# Patient Record
Sex: MALE | Race: WHITE | NOT HISPANIC OR LATINO | Employment: FULL TIME | URBAN - METROPOLITAN AREA
[De-identification: names, ages, dates, MRNs, and addresses within clinical notes are randomized per-mention and may not be internally consistent; named-entity substitution may affect disease eponyms.]

---

## 2018-01-09 ENCOUNTER — OFFICE VISIT CONVERTED (OUTPATIENT)
Dept: CARDIOLOGY | Facility: CLINIC | Age: 48
End: 2018-01-09
Attending: SPECIALIST

## 2018-06-08 ENCOUNTER — OFFICE VISIT CONVERTED (OUTPATIENT)
Dept: FAMILY MEDICINE CLINIC | Facility: CLINIC | Age: 48
End: 2018-06-08
Attending: NURSE PRACTITIONER

## 2018-10-16 ENCOUNTER — OFFICE VISIT CONVERTED (OUTPATIENT)
Dept: CARDIOLOGY | Facility: CLINIC | Age: 48
End: 2018-10-16
Attending: SPECIALIST

## 2019-01-28 ENCOUNTER — HOSPITAL ENCOUNTER (OUTPATIENT)
Dept: FAMILY MEDICINE CLINIC | Facility: CLINIC | Age: 49
Discharge: HOME | End: 2019-01-28
Attending: NURSE PRACTITIONER

## 2019-01-28 ENCOUNTER — OFFICE VISIT CONVERTED (OUTPATIENT)
Dept: FAMILY MEDICINE CLINIC | Facility: CLINIC | Age: 49
End: 2019-01-28
Attending: NURSE PRACTITIONER

## 2019-01-28 LAB
ALBUMIN SERPL-MCNC: 4.6 G/DL (ref 3.5–5)
ALBUMIN/GLOB SERPL: 1.6 {RATIO} (ref 1.4–2.6)
ALP SERPL-CCNC: 55 U/L (ref 53–128)
ALT SERPL-CCNC: 56 U/L (ref 10–40)
ANION GAP SERPL CALC-SCNC: 23 MMOL/L (ref 8–19)
APPEARANCE UR: CLEAR
AST SERPL-CCNC: 26 U/L (ref 15–50)
BASOPHILS # BLD AUTO: 0.04 10*3/UL (ref 0–0.2)
BASOPHILS NFR BLD AUTO: 0.49 % (ref 0–3)
BILIRUB SERPL-MCNC: 0.46 MG/DL (ref 0.2–1.3)
BILIRUB UR QL: NEGATIVE
BUN SERPL-MCNC: 15 MG/DL (ref 5–25)
BUN/CREAT SERPL: 13 {RATIO} (ref 6–20)
CALCIUM SERPL-MCNC: 9.2 MG/DL (ref 8.7–10.4)
CHLORIDE SERPL-SCNC: 104 MMOL/L (ref 99–111)
CHOLEST SERPL-MCNC: 133 MG/DL (ref 107–200)
CHOLEST/HDLC SERPL: 3.6 {RATIO} (ref 3–6)
COLOR UR: YELLOW
CONV CO2: 22 MMOL/L (ref 22–32)
CONV COLLECTION SOURCE (UA): NORMAL
CONV CREATININE URINE, RANDOM: 24.3 MG/DL (ref 10–300)
CONV MICROALBUM.,U,RANDOM: <12 MG/L (ref 0–20)
CONV TOTAL PROTEIN: 7.5 G/DL (ref 6.3–8.2)
CONV UROBILINOGEN IN URINE BY AUTOMATED TEST STRIP: 0.2 {EHRLICHU}/DL (ref 0.1–1)
CREAT UR-MCNC: 1.14 MG/DL (ref 0.7–1.2)
EOSINOPHIL # BLD AUTO: 0.22 10*3/UL (ref 0–0.7)
EOSINOPHIL # BLD AUTO: 2.79 % (ref 0–7)
ERYTHROCYTE [DISTWIDTH] IN BLOOD BY AUTOMATED COUNT: 12.2 % (ref 11.5–14.5)
EST. AVERAGE GLUCOSE BLD GHB EST-MCNC: 120 MG/DL
GFR SERPLBLD BASED ON 1.73 SQ M-ARVRAT: >60 ML/MIN/{1.73_M2}
GLOBULIN UR ELPH-MCNC: 2.9 G/DL (ref 2–3.5)
GLUCOSE SERPL-MCNC: 108 MG/DL (ref 70–99)
GLUCOSE UR QL: NEGATIVE MG/DL
HBA1C MFR BLD: 16.4 G/DL (ref 14–18)
HBA1C MFR BLD: 5.8 % (ref 3.5–5.7)
HCT VFR BLD AUTO: 47.6 % (ref 42–52)
HDLC SERPL-MCNC: 37 MG/DL (ref 40–60)
HGB UR QL STRIP: NEGATIVE
KETONES UR QL STRIP: NEGATIVE MG/DL
LDLC SERPL CALC-MCNC: 74 MG/DL (ref 70–100)
LEUKOCYTE ESTERASE UR QL STRIP: NEGATIVE
LYMPHOCYTES # BLD AUTO: 2.04 10*3/UL (ref 1–5)
MCH RBC QN AUTO: 30.4 PG (ref 27–31)
MCHC RBC AUTO-ENTMCNC: 34.5 G/DL (ref 33–37)
MCV RBC AUTO: 88.1 FL (ref 80–96)
MICROALBUMIN/CREAT UR: 49.4 MG/G{CRE} (ref 0–25)
MONOCYTES # BLD AUTO: 0.63 10*3/UL (ref 0.2–1.2)
MONOCYTES NFR BLD AUTO: 8.12 % (ref 3–10)
NEUTROPHILS # BLD AUTO: 4.87 10*3/UL (ref 2–8)
NEUTROPHILS NFR BLD AUTO: 62.4 % (ref 30–85)
NITRITE UR QL STRIP: NEGATIVE
NRBC BLD AUTO-RTO: 0 % (ref 0–0.01)
OSMOLALITY SERPL CALC.SUM OF ELEC: 299 MOSM/KG (ref 273–304)
PH UR STRIP.AUTO: 6.5 [PH] (ref 5–8)
PLATELET # BLD AUTO: 242 10*3/UL (ref 130–400)
PMV BLD AUTO: 8.3 FL (ref 7.4–10.4)
POTASSIUM SERPL-SCNC: 4.9 MMOL/L (ref 3.5–5.3)
PROT UR QL: NEGATIVE MG/DL
PSA SERPL-MCNC: 2.02 NG/ML (ref 0–4)
RBC # BLD AUTO: 5.4 10*6/UL (ref 4.7–6.1)
SODIUM SERPL-SCNC: 144 MMOL/L (ref 135–147)
SP GR UR: 1.01 (ref 1–1.03)
TRIGL SERPL-MCNC: 110 MG/DL (ref 40–150)
VARIANT LYMPHS NFR BLD MANUAL: 26.2 % (ref 20–45)
VLDLC SERPL-MCNC: 22 MG/DL (ref 5–37)
WBC # BLD AUTO: 7.8 10*3/UL (ref 4.8–10.8)

## 2019-03-12 ENCOUNTER — HOSPITAL ENCOUNTER (OUTPATIENT)
Dept: FAMILY MEDICINE CLINIC | Facility: CLINIC | Age: 49
Discharge: HOME | End: 2019-03-12
Attending: NURSE PRACTITIONER

## 2019-03-12 ENCOUNTER — OFFICE VISIT CONVERTED (OUTPATIENT)
Dept: FAMILY MEDICINE CLINIC | Facility: CLINIC | Age: 49
End: 2019-03-12
Attending: NURSE PRACTITIONER

## 2019-04-02 ENCOUNTER — OFFICE VISIT CONVERTED (OUTPATIENT)
Dept: FAMILY MEDICINE CLINIC | Facility: CLINIC | Age: 49
End: 2019-04-02
Attending: NURSE PRACTITIONER

## 2019-04-02 ENCOUNTER — HOSPITAL ENCOUNTER (OUTPATIENT)
Dept: FAMILY MEDICINE CLINIC | Facility: CLINIC | Age: 49
Discharge: HOME | End: 2019-04-02
Attending: NURSE PRACTITIONER

## 2019-04-02 LAB
ALBUMIN SERPL-MCNC: 4.6 G/DL (ref 3.5–5)
ALP SERPL-CCNC: 53 U/L (ref 53–128)
ALT SERPL-CCNC: 52 U/L (ref 10–40)
AST SERPL-CCNC: 25 U/L (ref 15–50)
BILIRUB SERPL-MCNC: 0.25 MG/DL (ref 0.2–1.3)
CONV BILI, CONJUGATED: <0.2 MG/DL (ref 0–0.6)
CONV TOTAL PROTEIN: 7.2 G/DL (ref 6.3–8.2)
CONV UNCONJUGATED BILIRUBIN: 0.1 MG/DL (ref 0–1.1)

## 2019-04-03 LAB
CONV HEPATITIS B SURFACE AG W CONFIRMATION RE: NEGATIVE
HAV IGM SERPL QL IA: NEGATIVE
HBV CORE IGM SERPL QL IA: NEGATIVE
HCV AB SER DONR QL: <0.1 S/CO RATIO (ref 0–0.9)

## 2019-05-09 ENCOUNTER — OFFICE VISIT CONVERTED (OUTPATIENT)
Dept: OTOLARYNGOLOGY | Facility: CLINIC | Age: 49
End: 2019-05-09
Attending: OTOLARYNGOLOGY

## 2019-05-17 ENCOUNTER — HOSPITAL ENCOUNTER (OUTPATIENT)
Dept: PHYSICAL THERAPY | Facility: CLINIC | Age: 49
Setting detail: RECURRING SERIES
Discharge: HOME | End: 2019-06-28
Attending: NURSE PRACTITIONER

## 2019-09-12 ENCOUNTER — OFFICE VISIT CONVERTED (OUTPATIENT)
Dept: FAMILY MEDICINE CLINIC | Facility: CLINIC | Age: 49
End: 2019-09-12
Attending: NURSE PRACTITIONER

## 2019-09-12 ENCOUNTER — HOSPITAL ENCOUNTER (OUTPATIENT)
Dept: FAMILY MEDICINE CLINIC | Facility: CLINIC | Age: 49
Discharge: HOME | End: 2019-09-12
Attending: NURSE PRACTITIONER

## 2019-09-12 LAB
ALBUMIN SERPL-MCNC: 5 G/DL (ref 3.5–5)
ALBUMIN/GLOB SERPL: 2 {RATIO} (ref 1.4–2.6)
ALP SERPL-CCNC: 48 U/L (ref 53–128)
ALT SERPL-CCNC: 41 U/L (ref 10–40)
ANION GAP SERPL CALC-SCNC: 18 MMOL/L (ref 8–19)
APPEARANCE UR: CLEAR
AST SERPL-CCNC: 26 U/L (ref 15–50)
BASOPHILS # BLD AUTO: 0.04 10*3/UL (ref 0–0.2)
BASOPHILS NFR BLD AUTO: 0.5 % (ref 0–3)
BILIRUB SERPL-MCNC: 0.4 MG/DL (ref 0.2–1.3)
BILIRUB UR QL: NEGATIVE
BUN SERPL-MCNC: 14 MG/DL (ref 5–25)
BUN/CREAT SERPL: 12 {RATIO} (ref 6–20)
CALCIUM SERPL-MCNC: 9.4 MG/DL (ref 8.7–10.4)
CHLORIDE SERPL-SCNC: 103 MMOL/L (ref 99–111)
CHOLEST SERPL-MCNC: 120 MG/DL (ref 107–200)
CHOLEST/HDLC SERPL: 3.1 {RATIO} (ref 3–6)
COLOR UR: YELLOW
CONV ABS IMM GRAN: 0.01 10*3/UL (ref 0–0.2)
CONV CO2: 26 MMOL/L (ref 22–32)
CONV COLLECTION SOURCE (UA): NORMAL
CONV IMMATURE GRAN: 0.1 % (ref 0–1.8)
CONV TOTAL PROTEIN: 7.5 G/DL (ref 6.3–8.2)
CONV UROBILINOGEN IN URINE BY AUTOMATED TEST STRIP: 0.2 {EHRLICHU}/DL (ref 0.1–1)
CREAT UR-MCNC: 1.16 MG/DL (ref 0.7–1.2)
DEPRECATED RDW RBC AUTO: 42.4 FL (ref 35.1–43.9)
EOSINOPHIL # BLD AUTO: 0.16 10*3/UL (ref 0–0.7)
EOSINOPHIL # BLD AUTO: 2 % (ref 0–7)
ERYTHROCYTE [DISTWIDTH] IN BLOOD BY AUTOMATED COUNT: 12.9 % (ref 11.6–14.4)
GFR SERPLBLD BASED ON 1.73 SQ M-ARVRAT: >60 ML/MIN/{1.73_M2}
GLOBULIN UR ELPH-MCNC: 2.5 G/DL (ref 2–3.5)
GLUCOSE SERPL-MCNC: 86 MG/DL (ref 70–99)
GLUCOSE UR QL: NEGATIVE MG/DL
HCT VFR BLD AUTO: 46.1 % (ref 42–52)
HDLC SERPL-MCNC: 39 MG/DL (ref 40–60)
HGB BLD-MCNC: 15 G/DL (ref 14–18)
HGB UR QL STRIP: NEGATIVE
KETONES UR QL STRIP: NEGATIVE MG/DL
LDLC SERPL CALC-MCNC: 63 MG/DL (ref 70–100)
LEUKOCYTE ESTERASE UR QL STRIP: NEGATIVE
LYMPHOCYTES # BLD AUTO: 2.36 10*3/UL (ref 1–5)
LYMPHOCYTES NFR BLD AUTO: 29 % (ref 20–45)
MCH RBC QN AUTO: 29.1 PG (ref 27–31)
MCHC RBC AUTO-ENTMCNC: 32.5 G/DL (ref 33–37)
MCV RBC AUTO: 89.3 FL (ref 80–96)
MONOCYTES # BLD AUTO: 0.74 10*3/UL (ref 0.2–1.2)
MONOCYTES NFR BLD AUTO: 9.1 % (ref 3–10)
NEUTROPHILS # BLD AUTO: 4.82 10*3/UL (ref 2–8)
NEUTROPHILS NFR BLD AUTO: 59.3 % (ref 30–85)
NITRITE UR QL STRIP: NEGATIVE
NRBC CBCN: 0 % (ref 0–0.7)
OSMOLALITY SERPL CALC.SUM OF ELEC: 294 MOSM/KG (ref 273–304)
PH UR STRIP.AUTO: 5.5 [PH] (ref 5–8)
PLATELET # BLD AUTO: 263 10*3/UL (ref 130–400)
PMV BLD AUTO: 10.8 FL (ref 9.4–12.4)
POTASSIUM SERPL-SCNC: 4.8 MMOL/L (ref 3.5–5.3)
PROT UR QL: NEGATIVE MG/DL
RBC # BLD AUTO: 5.16 10*6/UL (ref 4.7–6.1)
SODIUM SERPL-SCNC: 142 MMOL/L (ref 135–147)
SP GR UR: 1.01 (ref 1–1.03)
TRIGL SERPL-MCNC: 92 MG/DL (ref 40–150)
VLDLC SERPL-MCNC: 18 MG/DL (ref 5–37)
WBC # BLD AUTO: 8.13 10*3/UL (ref 4.8–10.8)

## 2019-11-29 ENCOUNTER — HOSPITAL ENCOUNTER (OUTPATIENT)
Dept: URGENT CARE | Facility: CLINIC | Age: 49
Discharge: HOME | End: 2019-11-29
Attending: NURSE PRACTITIONER

## 2020-03-13 ENCOUNTER — HOSPITAL ENCOUNTER (OUTPATIENT)
Dept: FAMILY MEDICINE CLINIC | Facility: CLINIC | Age: 50
Discharge: HOME | End: 2020-03-13
Attending: NURSE PRACTITIONER

## 2020-03-13 ENCOUNTER — OFFICE VISIT CONVERTED (OUTPATIENT)
Dept: FAMILY MEDICINE CLINIC | Facility: CLINIC | Age: 50
End: 2020-03-13
Attending: NURSE PRACTITIONER

## 2020-03-13 LAB
ALBUMIN SERPL-MCNC: 4.8 G/DL (ref 3.5–5)
ALBUMIN/GLOB SERPL: 1.9 {RATIO} (ref 1.4–2.6)
ALP SERPL-CCNC: 44 U/L (ref 53–128)
ALT SERPL-CCNC: 48 U/L (ref 10–40)
ANION GAP SERPL CALC-SCNC: 21 MMOL/L (ref 8–19)
APPEARANCE UR: CLEAR
AST SERPL-CCNC: 24 U/L (ref 15–50)
BASOPHILS # BLD AUTO: 0.06 10*3/UL (ref 0–0.2)
BASOPHILS NFR BLD AUTO: 0.7 % (ref 0–3)
BILIRUB SERPL-MCNC: 0.36 MG/DL (ref 0.2–1.3)
BILIRUB UR QL: NEGATIVE
BUN SERPL-MCNC: 13 MG/DL (ref 5–25)
BUN/CREAT SERPL: 12 {RATIO} (ref 6–20)
CALCIUM SERPL-MCNC: 9.5 MG/DL (ref 8.7–10.4)
CHLORIDE SERPL-SCNC: 100 MMOL/L (ref 99–111)
CHOLEST SERPL-MCNC: 108 MG/DL (ref 107–200)
CHOLEST/HDLC SERPL: 2.9 {RATIO} (ref 3–6)
COLOR UR: YELLOW
CONV ABS IMM GRAN: 0.02 10*3/UL (ref 0–0.2)
CONV CO2: 23 MMOL/L (ref 22–32)
CONV COLLECTION SOURCE (UA): NORMAL
CONV IMMATURE GRAN: 0.2 % (ref 0–1.8)
CONV TOTAL PROTEIN: 7.3 G/DL (ref 6.3–8.2)
CONV UROBILINOGEN IN URINE BY AUTOMATED TEST STRIP: 0.2 {EHRLICHU}/DL (ref 0.1–1)
CREAT UR-MCNC: 1.07 MG/DL (ref 0.7–1.2)
DEPRECATED RDW RBC AUTO: 43.3 FL (ref 35.1–43.9)
EOSINOPHIL # BLD AUTO: 0.15 10*3/UL (ref 0–0.7)
EOSINOPHIL # BLD AUTO: 1.8 % (ref 0–7)
ERYTHROCYTE [DISTWIDTH] IN BLOOD BY AUTOMATED COUNT: 13.1 % (ref 11.6–14.4)
GFR SERPLBLD BASED ON 1.73 SQ M-ARVRAT: >60 ML/MIN/{1.73_M2}
GLOBULIN UR ELPH-MCNC: 2.5 G/DL (ref 2–3.5)
GLUCOSE SERPL-MCNC: 112 MG/DL (ref 70–99)
GLUCOSE UR QL: NEGATIVE MG/DL
HCT VFR BLD AUTO: 51 % (ref 42–52)
HDLC SERPL-MCNC: 37 MG/DL (ref 40–60)
HGB BLD-MCNC: 16.6 G/DL (ref 14–18)
HGB UR QL STRIP: NEGATIVE
KETONES UR QL STRIP: NEGATIVE MG/DL
LDLC SERPL CALC-MCNC: 53 MG/DL (ref 70–100)
LEUKOCYTE ESTERASE UR QL STRIP: NEGATIVE
LYMPHOCYTES # BLD AUTO: 1.87 10*3/UL (ref 1–5)
LYMPHOCYTES NFR BLD AUTO: 21.9 % (ref 20–45)
MCH RBC QN AUTO: 29.2 PG (ref 27–31)
MCHC RBC AUTO-ENTMCNC: 32.5 G/DL (ref 33–37)
MCV RBC AUTO: 89.8 FL (ref 80–96)
MONOCYTES # BLD AUTO: 0.77 10*3/UL (ref 0.2–1.2)
MONOCYTES NFR BLD AUTO: 9 % (ref 3–10)
NEUTROPHILS # BLD AUTO: 5.67 10*3/UL (ref 2–8)
NEUTROPHILS NFR BLD AUTO: 66.4 % (ref 30–85)
NITRITE UR QL STRIP: NEGATIVE
NRBC CBCN: 0 % (ref 0–0.7)
OSMOLALITY SERPL CALC.SUM OF ELEC: 289 MOSM/KG (ref 273–304)
PH UR STRIP.AUTO: 7.5 [PH] (ref 5–8)
PLATELET # BLD AUTO: 301 10*3/UL (ref 130–400)
PMV BLD AUTO: 11 FL (ref 9.4–12.4)
POTASSIUM SERPL-SCNC: 4.8 MMOL/L (ref 3.5–5.3)
PROT UR QL: NEGATIVE MG/DL
RBC # BLD AUTO: 5.68 10*6/UL (ref 4.7–6.1)
SODIUM SERPL-SCNC: 139 MMOL/L (ref 135–147)
SP GR UR: 1.01 (ref 1–1.03)
TRIGL SERPL-MCNC: 91 MG/DL (ref 40–150)
VLDLC SERPL-MCNC: 18 MG/DL (ref 5–37)
WBC # BLD AUTO: 8.54 10*3/UL (ref 4.8–10.8)

## 2020-06-19 ENCOUNTER — OFFICE VISIT CONVERTED (OUTPATIENT)
Dept: FAMILY MEDICINE CLINIC | Facility: CLINIC | Age: 50
End: 2020-06-19
Attending: NURSE PRACTITIONER

## 2020-06-19 ENCOUNTER — HOSPITAL ENCOUNTER (OUTPATIENT)
Dept: FAMILY MEDICINE CLINIC | Facility: CLINIC | Age: 50
Discharge: HOME | End: 2020-06-19
Attending: NURSE PRACTITIONER

## 2020-06-19 LAB
ALBUMIN SERPL-MCNC: 4.8 G/DL (ref 3.5–5)
ALBUMIN/GLOB SERPL: 2 {RATIO} (ref 1.4–2.6)
ALP SERPL-CCNC: 49 U/L (ref 53–128)
ALT SERPL-CCNC: 38 U/L (ref 10–40)
ANION GAP SERPL CALC-SCNC: 20 MMOL/L (ref 8–19)
AST SERPL-CCNC: 22 U/L (ref 15–50)
BASOPHILS # BLD AUTO: 0.06 10*3/UL (ref 0–0.2)
BASOPHILS NFR BLD AUTO: 0.7 % (ref 0–3)
BILIRUB SERPL-MCNC: 0.42 MG/DL (ref 0.2–1.3)
BUN SERPL-MCNC: 16 MG/DL (ref 5–25)
BUN/CREAT SERPL: 15 {RATIO} (ref 6–20)
CALCIUM SERPL-MCNC: 9.7 MG/DL (ref 8.7–10.4)
CHLORIDE SERPL-SCNC: 100 MMOL/L (ref 99–111)
CHOLEST SERPL-MCNC: 110 MG/DL (ref 107–200)
CHOLEST/HDLC SERPL: 2.9 {RATIO} (ref 3–6)
CONV ABS IMM GRAN: 0.02 10*3/UL (ref 0–0.2)
CONV CO2: 23 MMOL/L (ref 22–32)
CONV IMMATURE GRAN: 0.2 % (ref 0–1.8)
CONV TOTAL PROTEIN: 7.2 G/DL (ref 6.3–8.2)
CREAT UR-MCNC: 1.07 MG/DL (ref 0.7–1.2)
DEPRECATED RDW RBC AUTO: 44 FL (ref 35.1–43.9)
EOSINOPHIL # BLD AUTO: 0.13 10*3/UL (ref 0–0.7)
EOSINOPHIL # BLD AUTO: 1.6 % (ref 0–7)
ERYTHROCYTE [DISTWIDTH] IN BLOOD BY AUTOMATED COUNT: 13.3 % (ref 11.6–14.4)
GFR SERPLBLD BASED ON 1.73 SQ M-ARVRAT: >60 ML/MIN/{1.73_M2}
GLOBULIN UR ELPH-MCNC: 2.4 G/DL (ref 2–3.5)
GLUCOSE SERPL-MCNC: 125 MG/DL (ref 70–99)
HCT VFR BLD AUTO: 49.1 % (ref 42–52)
HDLC SERPL-MCNC: 38 MG/DL (ref 40–60)
HGB BLD-MCNC: 16.1 G/DL (ref 14–18)
LDLC SERPL CALC-MCNC: 60 MG/DL (ref 70–100)
LYMPHOCYTES # BLD AUTO: 1.9 10*3/UL (ref 1–5)
LYMPHOCYTES NFR BLD AUTO: 23.3 % (ref 20–45)
MCH RBC QN AUTO: 29.6 PG (ref 27–31)
MCHC RBC AUTO-ENTMCNC: 32.8 G/DL (ref 33–37)
MCV RBC AUTO: 90.3 FL (ref 80–96)
MONOCYTES # BLD AUTO: 0.76 10*3/UL (ref 0.2–1.2)
MONOCYTES NFR BLD AUTO: 9.3 % (ref 3–10)
NEUTROPHILS # BLD AUTO: 5.28 10*3/UL (ref 2–8)
NEUTROPHILS NFR BLD AUTO: 64.9 % (ref 30–85)
NRBC CBCN: 0 % (ref 0–0.7)
OSMOLALITY SERPL CALC.SUM OF ELEC: 289 MOSM/KG (ref 273–304)
PLATELET # BLD AUTO: 278 10*3/UL (ref 130–400)
PMV BLD AUTO: 11 FL (ref 9.4–12.4)
POTASSIUM SERPL-SCNC: 4.9 MMOL/L (ref 3.5–5.3)
PSA SERPL-MCNC: 2.38 NG/ML (ref 0–4)
RBC # BLD AUTO: 5.44 10*6/UL (ref 4.7–6.1)
SODIUM SERPL-SCNC: 138 MMOL/L (ref 135–147)
TRIGL SERPL-MCNC: 59 MG/DL (ref 40–150)
VLDLC SERPL-MCNC: 12 MG/DL (ref 5–37)
WBC # BLD AUTO: 8.15 10*3/UL (ref 4.8–10.8)

## 2020-12-18 ENCOUNTER — OFFICE VISIT CONVERTED (OUTPATIENT)
Dept: FAMILY MEDICINE CLINIC | Facility: CLINIC | Age: 50
End: 2020-12-18
Attending: NURSE PRACTITIONER

## 2020-12-19 ENCOUNTER — HOSPITAL ENCOUNTER (OUTPATIENT)
Dept: FAMILY MEDICINE CLINIC | Facility: CLINIC | Age: 50
Discharge: HOME | End: 2020-12-19
Attending: NURSE PRACTITIONER

## 2020-12-19 LAB
ALBUMIN SERPL-MCNC: 4.4 G/DL (ref 3.5–5)
ALBUMIN/GLOB SERPL: 1.9 {RATIO} (ref 1.4–2.6)
ALP SERPL-CCNC: 48 U/L (ref 53–128)
ALT SERPL-CCNC: 35 U/L (ref 10–40)
ANION GAP SERPL CALC-SCNC: 13 MMOL/L (ref 8–19)
AST SERPL-CCNC: 28 U/L (ref 15–50)
BILIRUB SERPL-MCNC: 0.4 MG/DL (ref 0.2–1.3)
BUN SERPL-MCNC: 15 MG/DL (ref 5–25)
BUN/CREAT SERPL: 12 {RATIO} (ref 6–20)
CALCIUM SERPL-MCNC: 9 MG/DL (ref 8.7–10.4)
CHLORIDE SERPL-SCNC: 101 MMOL/L (ref 99–111)
CHOLEST SERPL-MCNC: 106 MG/DL (ref 107–200)
CHOLEST/HDLC SERPL: 3 {RATIO} (ref 3–6)
CONV CO2: 27 MMOL/L (ref 22–32)
CONV TOTAL PROTEIN: 6.7 G/DL (ref 6.3–8.2)
CREAT UR-MCNC: 1.28 MG/DL (ref 0.7–1.2)
EST. AVERAGE GLUCOSE BLD GHB EST-MCNC: 128 MG/DL
GFR SERPLBLD BASED ON 1.73 SQ M-ARVRAT: >60 ML/MIN/{1.73_M2}
GLOBULIN UR ELPH-MCNC: 2.3 G/DL (ref 2–3.5)
GLUCOSE SERPL-MCNC: 101 MG/DL (ref 70–99)
HBA1C MFR BLD: 6.1 % (ref 3.5–5.7)
HDLC SERPL-MCNC: 35 MG/DL (ref 40–60)
LDLC SERPL CALC-MCNC: 55 MG/DL (ref 70–100)
OSMOLALITY SERPL CALC.SUM OF ELEC: 283 MOSM/KG (ref 273–304)
POTASSIUM SERPL-SCNC: 4.5 MMOL/L (ref 3.5–5.3)
SODIUM SERPL-SCNC: 136 MMOL/L (ref 135–147)
TRIGL SERPL-MCNC: 80 MG/DL (ref 40–150)
VLDLC SERPL-MCNC: 16 MG/DL (ref 5–37)

## 2021-03-11 ENCOUNTER — OFFICE VISIT CONVERTED (OUTPATIENT)
Dept: FAMILY MEDICINE CLINIC | Facility: CLINIC | Age: 51
End: 2021-03-11
Attending: NURSE PRACTITIONER

## 2021-05-07 NOTE — PROGRESS NOTES
Progress Note      Patient Name: Brent Crane   Patient ID: [id number]   Sex: Male   YOB: 1970        Visit Date: April 2, 2019    Provider: NAHID Frankel   Location: [facility]   Location Address: [address] [place] [address]  [id number]   Location Phone: [phone]          Chief Complaint     follow up on shoulder  labs ordered in February.       History Of Present Illness  Brent Crane is a 48 year old /White male who presents for evaluation and treatment of:      follow up to right shoulder pain - pain is improving but continue to have numbness and tingling in the hand, worsens with driving and sitting in certain positions and will shoot pain through the shoulder down the arm and to the fingers, feels like electrical bolts - pain is isolated to the posterior neck and shoulder blade area - took the muscle relaxers at night and could not tell a huge difference with the medication, the more active the better it felt until he sit in the car to drive home 45 minutes - tilting head to the right shoulder shoots pain into the armpit and turning in another direction will shoot into shoulder blade     pt is also fasting for labs that were ordered in February       Past Medical History  Disease Name Date Onset Notes   Allergic rhinitis --  --    High blood pressure --  --    Hyperlipidemia --  --          Past Surgical History  Procedure Name Date Notes   I have had no surgeries --  --          Medication List  Name Date Started Instructions   esomeprazole magnesium 20 mg oral capsule,delayed release(DR/EC) 06/08/2018 take 1 capsule (20 mg) by oral route once daily at least 1 hour before a meal   lisinopril 10 mg oral tablet 01/28/2019 take 1 tablet (10 mg) by oral route once daily ( along with 5 mg total dose 15 mg daily)   lisinopril 5 mg oral tablet 01/28/2019 take 1 tablet (5 mg) by oral route once daily with the 10 mg total dose 15 mg daily  "  rosuvastatin 10 mg oral tablet 01/28/2019 take 1 tablet (10 mg) by oral route once daily for 90 days         Allergy List  Allergen Name Date Reaction Notes   NO KNOWN DRUG ALLERGIES --  --  --          Family Medical History  Disease Name Relative/Age Notes   Malignant neoplasm  --          Social History  Finding Status Start/Stop Quantity Notes   Alcohol Light --/-- --  --    Tobacco Current every day --/-- 3/4 can a day 3/4 can per day         Review of Systems  · Constitutional  o Denies  o : fever, chills  · Cardiovascular  o Denies  o : chest pain, palpitations  · Respiratory  o Denies  o : shortness of breath  · Gastrointestinal  o Denies  o : nausea, vomiting, diarrhea  · Neurologic  o * See HPI  · Musculoskeletal  o * See HPI      Vitals  Date Time BP Position Site L\R Cuff Size HR RR TEMP (F) WT  HT  BMI kg/m2 BSA m2 O2 Sat HC       04/02/2019 08:12 /88 Sitting    86 - R  97.2 175lbs 6oz 5'  7.5\" 27.06 1.95 98 %          Physical Examination  · Constitutional  o Appearance  o : well developed, well-nourished, in no acute distress  · Eyes  o Conjunctivae  o : conjunctivae normal  o Pupils and Irises  o : pupils equal and round, pupils reactive to light bilaterally  · Neck  o Inspection/Palpation  o : supple  o Thyroid  o : no thyromegaly  · Respiratory  o Respiratory Effort  o : breathing unlabored  o Auscultation of Lungs  o : clear to ascultation  · Cardiovascular  o Heart  o :   § Auscultation of Heart  § : regular rate and rhythm  o Peripheral Vascular System  o :   § Extremities  § : no edema  · Lymphatic  o Neck  o : no lymphadenopathy present  · Musculoskeletal  o General  o :   § General Musculoskeletal  § : increased muscle tension in the neck and trapezius muscles without tenderness along the cervical spine. No joint swelling or deformity., Muscle tone, strength, and development grossly normal.  · Skin and Subcutaneous Tissue  o General Inspection  o : NL tone  · Neurologic  o Gait and " Station  o :   § Gait Screening  § : normal gait  · Psychiatric  o Mood and Affect  o : mood normal, affect appropriate              Assessment  · Nocturia     788.43/R35.1  · Neck pain     723.1/M54.2      Plan  · Orders  o ACO-39: Current medications updated and reviewed () - - 04/02/2019  o Cervical Spine Complete [facility] ([id number]) - 723.1/M54.2 - 04/02/2019  o UROLOGY CONSULTATION ([facility]) - 788.43/R35.1 - 04/02/2019   PSA slightly elevated for age  o PHYSICAL THERAPY CONSULTATION ([facility]) - 723.1/M54.2 - 04/02/2019  · Medications  o meloxicam 15 mg oral tablet   SIG: take 1 tablet (15 mg) by oral route once daily for 30 days   DISP: (30) tablets with 2 refills  Prescribed on 04/02/2019     o cyclobenzaprine 10 mg oral tablet   SIG: take 1 tablet by oral route once a day (at bedtime) for 30 days   DISP: (30) tablets with 0 refills  Prescribed on 04/02/2019     · Instructions  o Take all medications as prescribed/directed.  o Patient was educated/instructed on their diagnosis, treatment and medications prior to discharge from the clinic today.  · Disposition  o Follow up as needed.            Electronically Signed by: NAHID Frankel -Author on April 2, 2019 09:09:03 AM

## 2021-05-07 NOTE — PROGRESS NOTES
Progress Note      Patient Name: Danilo Crane   Patient ID: [id number]   Sex: Male   YOB: 1970        Visit Date: June 19, 2020    Provider: NAHID Frankel   Location: [facility]   Location Address: [address] [place] [address] [place]  [address]   Location Phone: [phone]          Chief Complaint     med refills       History Of Present Illness  Danilo Crane is a 49 year old /White male who presents for evaluation and treatment of:      HTN: stable in office - when checks at home systolic is usually in 130's - does not follow a low salt diet, but does not add additional salt - very little exercise, walks at work 8 hours/day and some yard work    trying to quit smoking    GERD: using OTC omeprazole - well controlled     Hyperlipidemia: continues to have some cramps to the feet usually at night          Past Medical History  Disease Name Date Onset Notes   Allergic rhinitis --  --    Essential hypertension 03/13/2020 --    High blood pressure --  --    Hyperlipidemia --  --          Past Surgical History  Procedure Name Date Notes   I have had no surgeries --  --          Medication List  Name Date Started Instructions   esomeprazole magnesium 20 mg oral capsule,delayed release(/EC) 06/08/2018 take 1 capsule (20 mg) by oral route once daily at least 1 hour before a meal   lisinopril-hydrochlorothiazide 20-12.5 mg oral tablet 03/13/2020 take 1 tablet by oral route once daily for 90 days   rosuvastatin 10 mg oral tablet 03/13/2020 take 1 tablet (10 mg) by oral route once daily for 90 days         Allergy List  Allergen Name Date Reaction Notes   NO KNOWN DRUG ALLERGIES --  --  --          Family Medical History  Disease Name Relative/Age Notes   Malignant neoplasm  --          Social History  Finding Status Start/Stop Quantity Notes   Alcohol Light --/-- --  --    Tobacco Current every day --/-- 1/2 can a day 1/2 can per day         Review of  "Systems  · Constitutional  o Denies  o : headache  · Cardiovascular  o Denies  o : chest pain, lower extremity edema, palpitations  · Respiratory  o Denies  o : shortness of breath, wheezing, cough      Vitals  Date Time BP Position Site L\R Cuff Size HR RR TEMP (F) WT  HT  BMI kg/m2 BSA m2 O2 Sat        06/19/2020 08:09 /84 Sitting    81 - R  91.4 169lbs 6oz 5'  6\" 27.34 1.89 97 %          Physical Examination  · Constitutional  o Appearance  o : well developed, well-nourished, in no acute distress  · Eyes  o Conjunctivae  o : conjunctivae normal  · Neck  o Inspection/Palpation  o : supple  o Thyroid  o : no thyromegaly  · Respiratory  o Respiratory Effort  o : breathing unlabored  o Auscultation of Lungs  o : clear to ascultation  · Cardiovascular  o Heart  o :   § Auscultation of Heart  § : regular rate and rhythm  o Peripheral Vascular System  o :   § Extremities  § : no edema  · Lymphatic  o Neck  o : no lymphadenopathy present  · Musculoskeletal  o General  o :   § General Musculoskeletal  § : No joint swelling or deformity., Muscle tone, strength, and development grossly normal.  · Skin and Subcutaneous Tissue  o General Inspection  o : no lesions present, no areas of discoloration, skin turgor normal, texture normal  · Neurologic  o Gait and Station  o :   § Gait Screening  § : normal gait  · Psychiatric  o Mood and Affect  o : mood normal, affect appropriate          Assessment  · Essential hypertension     401.9/I10  · GERD (gastroesophageal reflux disease)     530.81/K21.9  · Hyperlipidemia     272.4/E78.5  · Prostate cancer screening     V76.44/Z12.5      Plan  · Orders  o HTN/Lipid Panel (CMP, Lipid) [facility] ([id number], 10690) - 401.9/I10 - 06/19/2020  o CBC with Auto Diff [facility] ([id number]) - 401.9/I10 - 06/19/2020  o ACO-18: Negative screen for clinical depression using a standardized tool () - - 06/19/2020  o ACO-39: Current medications updated and reviewed () - - 06/19/2020  o PSA Ultrasensitive, " ANNUAL SCREENING [facility] ([id number]) - V76.44/Z12.5 - 06/19/2020  · Medications  o lisinopril-hydrochlorothiazide 20-12.5 mg oral tablet   SIG: take 1 tablet by oral route once daily for 90 days   DISP: (90) tablets with 1 refills  Adjusted on 06/19/2020     o rosuvastatin 10 mg oral tablet   SIG: take 1 tablet (10 mg) by oral route once daily for 90 days   DISP: (90) Tablet with 1 refills  Adjusted on 06/19/2020     o Medications have been Reconciled  o Transition of Care or Provider Policy  · Instructions  o Take all medications as prescribed/directed.  o Patient was educated/instructed on their diagnosis, treatment and medications prior to discharge from the clinic today.  o Chronic conditions reviewed and taken into consideration for today's treatment plan.            Electronically Signed by: NAHID Frankel -Author on June 19, 2020 09:00:23 AM

## 2021-05-07 NOTE — PROGRESS NOTES
Progress Note      Patient Name: Brent Crane   Patient ID: [id number]   Sex: Male   YOB: 1970        Visit Date: September 12, 2019    Provider: NAHID Frankel   Location: [facility]   Location Address: [address] [place] [address] [address] [place]  [address]   Location Phone: [phone]          Chief Complaint     medication refills       History Of Present Illness  Brent Crane is a 48 year old /White male who presents for evaluation and treatment of:      medication refills    HTN: states he ran out of medication awhile ago and has been taking Lisinopril 20mg with HCTZ - pt states he had decreased HA and his BP was slightly lower, admits to occasional muscle cramps in the feet - diet has been all over the place - activity includes walking while at work    Hyperlipidemia: no SE of medication    GERD: stable on medication - has been on Nexium for years and states has been buying OTC     Had an A1C drawn in Jan and states he would get jittery especially if he is hot and hasn't eaten     States he never went to Urology - he continues to have some post-voiding dribbling and nocturia           Past Medical History  Disease Name Date Onset Notes   Allergic rhinitis --  --    High blood pressure --  --    Hyperlipidemia --  --          Past Surgical History  Procedure Name Date Notes   I have had no surgeries --  --          Medication List  Name Date Started Instructions   esomeprazole magnesium 20 mg oral capsule,delayed release(/EC) 06/08/2018 take 1 capsule (20 mg) by oral route once daily at least 1 hour before a meal   lisinopril 10 mg oral tablet 07/28/2019 take 1 tablet (10 mg) by oral route once daily ( along with 5 mg total dose 15 mg daily)   lisinopril 5 mg oral tablet 07/28/2019 take 1 tablet (5 mg) by oral route once daily with the 10 mg total dose 15 mg daily   lisinopril-hydrochlorothiazide 20-12.5 mg oral tablet 09/12/2019 take 1 tablet by oral  route once daily for 90 days   rosuvastatin 10 mg oral tablet 01/28/2019 take 1 tablet (10 mg) by oral route once daily for 90 days         Allergy List  Allergen Name Date Reaction Notes   NO KNOWN DRUG ALLERGIES --  --  --        Allergies Reconciled  Family Medical History  Disease Name Relative/Age Notes   Malignant neoplasm  --          Social History  Finding Status Start/Stop Quantity Notes   Alcohol Light --/-- --  --    Tobacco Current every day --/-- 1/2 can a day 1/2 can per day         Review of Systems  · Constitutional  o Denies  o : fever, chills, body aches  · Cardiovascular  o Denies  o : chest pain, palpitations  · Respiratory  o Denies  o : shortness of breath, wheezing, cough  · Gastrointestinal  o Denies  o : nausea, vomiting      Vitals  Date Time BP Position Site L\R Cuff Size HR RR TEMP (F) WT  HT  BMI kg/m2 BSA m2 O2 Sat HC       09/12/2019 03:12 /92 Sitting    91 - R  97.2 172lbs 0oz    98 %          Physical Examination  · Constitutional  o Appearance  o : well developed, well-nourished, in no acute distress  · Eyes  o Conjunctivae  o : conjunctivae normal  o Pupils and Irises  o : pupils equal and round, pupils reactive to light bilaterally  · Neck  o Inspection/Palpation  o : supple  o Thyroid  o : no thyromegaly  · Respiratory  o Respiratory Effort  o : breathing unlabored  o Auscultation of Lungs  o : clear to ascultation  · Cardiovascular  o Heart  o :   § Auscultation of Heart  § : regular rate and rhythm  o Peripheral Vascular System  o :   § Extremities  § : no edema  · Lymphatic  o Neck  o : no lymphadenopathy present  · Musculoskeletal  o General  o :   § General Musculoskeletal  § : No joint swelling or deformity. Muscle tone, strength, and development grossly normal.  · Skin and Subcutaneous Tissue  o General Inspection  o : NL tone  · Neurologic  o Gait and Station  o :   § Gait Screening  § : normal gait  · Psychiatric  o Mood and Affect  o : mood normal, affect  appropriate              Assessment  · Essential hypertension     401.9/I10  · Hyperlipidemia     272.4/E78.5  · Nocturia     788.43/R35.1  · Post-void dribbling     788.35/N39.43      Plan  · Orders  o CBC with Auto Diff [facility] ([id number]) - 401.9/I10 - 09/12/2019  o CMP [facility] ([id number]) - 401.9/I10 - 09/12/2019  o Lipid Panel [facility] ([id number]) - 272.4/E78.5 - 09/12/2019  o Urinalysis with Reflex Microscopy if abnormal ([facility]) (55794) - 401.9/I10 - 09/12/2019  o ACO-39: Current medications updated and reviewed () - - 09/12/2019  o PSA Ultrasensitive, ANNUAL SCREENING [facility] ([id number]) - 788.43/R35.1, 788.35/N39.43 - 09/12/2019  · Medications  o lisinopril-hydrochlorothiazide 20-12.5 mg oral tablet   SIG: take 1 tablet by oral route once daily for 90 days   DISP: (90) tablets with 1 refills  Adjusted on 09/12/2019     o rosuvastatin 10 mg oral tablet   SIG: take 1 tablet (10 mg) by oral route once daily for 90 days   DISP: (90) Tablet with 1 refills  Adjusted on 09/12/2019     o lisinopril 10 mg oral tablet   SIG: take 1 tablet (10 mg) by oral route once daily ( along with 5 mg total dose 15 mg daily)   DISP: (30) tablets with 0 refills  Discontinued on 09/12/2019     o lisinopril 5 mg oral tablet   SIG: take 1 tablet (5 mg) by oral route once daily with the 10 mg total dose 15 mg daily   DISP: (30) Tablet with 0 refills  Discontinued on 09/12/2019     o Medications have been Reconciled  o Transition of Care or Provider Policy  · Instructions  o Advised that cheeses and other sources of dairy fats, animal fats, fast food, and the extras (candy, pasteries, pies, doughnuts and cookies) all contain LDL raising nutrients. Advised to increase fruits, vegetables, whole grains, and to monitor portion sizes.   o Take all medications as prescribed/directed.  o Patient was educated/instructed on their diagnosis, treatment and medications prior to discharge from the clinic today.  o Chronic conditions reviewed and taken into consideration for  today's treatment plan.  · Disposition  o Follow up as needed.            Electronically Signed by: NAHID Frankel -Author on September 12, 2019 03:38:23 PM

## 2021-05-07 NOTE — PROGRESS NOTES
Progress Note      Patient Name: Brent Crane   Patient ID: [id number]   Sex: Male   YOB: 1970        Visit Date: June 8, 2018    Provider: NAHID Frankel   Location: [facility]   Location Address: [address] [place] [address]  [id number]   Location Phone: [phone]          Chief Complaint     needs medications refilled, wants to talk about nexium       History Of Present Illness  Brent Crane is a 47 year old /White male who presents for evaluation and treatment of:      Chronic health conditions and obtain refills of medications    HTN: stable - at times will feel like his BP raises - denies CP, palpitations, swelling of the legs. does not follow a low salt diet - decreased physical activity due to a right sided headache - drinks good amount of water (about 100 oz/day while at work)    Hyperlipidemia: will rarely have muscle cramps - takes 200mg CoQ10 daily    GERD: taking Nexium OTC - he has been taking 20mg once daily OTC and symptoms are well controlled - requesting to have prescription due to wife has new insurance and thinks it maybe covered       Past Medical History  Disease Name Date Onset Notes   Allergic rhinitis --  --    High blood pressure --  --    Hyperlipidemia --  --          Past Surgical History  Procedure Name Date Notes   I have had no surgeries --  --          Medication List  Name Date Started Instructions   esomeprazole magnesium 20 mg oral capsule,delayed release(DR/EC)  take 1 capsule daily   lisinopril 10 mg oral tablet 12/06/2017 take 1 tablet (10 mg) by oral route once daily ( along with 5 mg total dose 15 mg daily)   lisinopril 5 mg oral tablet 12/06/2017 take 1 tablet (5 mg) by oral route once daily with the 10 mg total dose 15 mg daily   rosuvastatin 10 mg oral tablet 12/06/2017 take 1 tablet (10 mg) by oral route once daily         Allergy List  Allergen Name Date Reaction Notes   NO KNOWN DRUG ALLERGIES --   --  --          Family Medical History  Disease Name Relative/Age Notes   Malignant neoplasm / --          Social History  Finding Status Start/Stop Quantity Notes   Alcohol Light --/-- --  --    Tobacco Current every day --/-- 3/4 can a day 3/4 can per day         Review of Systems  · Constitutional  o Admits  o : headache  · Eyes  o Admits  o : changes in vision  o Denies  o : blurred vision  · Cardiovascular  o Denies  o : chest pain, swelling (feet, ankles, hands), palpitations  · Respiratory  o Denies  o : shortness of breath, wheezing, cough  · Gastrointestinal  o Admits  o : heartburn  · Allergic-Immunologic  o Admits  o : sinus allergy symptoms  o Denies  o : frequent illnesses      Vitals  Date Time BP Position Site L\R Cuff Size HR RR TEMP(F) WT  HT  BMI kg/m2 BSA m2 O2 Sat HC       06/08/2018 03:26 /86 Sitting    107 - R  97.6 175lbs 0oz    100 %           Physical Examination  · Constitutional  o Appearance  o : well developed, well-nourished, in no acute distress  · Eyes  o Conjunctivae  o : conjunctivae normal  o Pupils and Irises  o : pupils equal and round, pupils reactive to light bilaterally  · Neck  o Inspection/Palpation  o : supple  o Thyroid  o : no thyromegaly  · Respiratory  o Respiratory Effort  o : breathing unlabored  o Auscultation of Lungs  o : clear to ascultation  · Cardiovascular  o Heart  o :   § Auscultation of Heart  § : regular rate and rhythm  o Peripheral Vascular System  o :   § Extremities  § : no edema  · Lymphatic  o Neck  o : no lymphadenopathy present  · Musculoskeletal  o General  o :   § General Musculoskeletal  § : No joint swelling or deformity. Muscle tone, strength, and development grossly normal.  · Skin and Subcutaneous Tissue  o General Inspection  o : NL tone  · Neurologic  o Gait and Station  o :   § Gait Screening  § : normal gait  · Psychiatric  o Mood and Affect  o : mood normal, affect appropriate              Assessment  · Essential  hypertension     401.9/I10  · GERD (gastroesophageal reflux disease)     530.81/K21.9  · Hyperlipidemia     272.4/E78.5      Plan  · Orders  o CBC with Auto Diff [facility] ([id number]) - 401.9/I10 - 06/08/2018  o CMP [facility] ([id number]) - 401.9/I10 - 06/08/2018  o Lipid Panel [facility] ([id number]) - 272.4/E78.5 - 06/08/2018  o Urinalysis with Reflex Microscopy if abnormal ([facility]) ([id number]) - 272.4/E78.5, 401.9/I10 - 06/08/2018  o ACO-39: Current medications updated and reviewed () - - 06/08/2018  · Medications  o esomeprazole magnesium 20 mg oral capsule,delayed release(DR/EC)   SIG: take 1 capsule (20 mg) by oral route once daily at least 1 hour before a meal   DISP: (90) capsule with 1 refills  Adjusted on 06/08/2018     o lisinopril 10 mg oral tablet   SIG: take 1 tablet (10 mg) by oral route once daily ( along with 5 mg total dose 15 mg daily)   DISP: (90) tablets with 1 refills  Adjusted on 06/08/2018     o lisinopril 5 mg oral tablet   SIG: take 1 tablet (5 mg) by oral route once daily with the 10 mg total dose 15 mg daily   DISP: (90) tablets with 1 refills  Adjusted on 06/08/2018     o rosuvastatin 10 mg oral tablet   SIG: take 1 tablet (10 mg) by oral route once daily for 90 days   DISP: (90) tablet with 1 refills  Adjusted on 06/08/2018     · Instructions  o Patient advised to monitor blood pressure (B/P) at home and journal readings. Patient informed that a B/P reading at home of more than 135/85 is considered hypertension. For readings greater myuf028/90 or higher patient is advised to follow up in the office with readings for management. Patient advised to limit sodium intake.  o Advised that cheeses and other sources of dairy fats, animal fats, fast food, and the extras (candy, pasteries, pies, doughnuts and cookies) all contain LDL raising nutrients. Advised to increase fruits, vegetables, whole grains, and to monitor portion sizes.   o Take all medications as prescribed/directed.  o Patient was educated/instructed on their  diagnosis, treatment and medications prior to discharge from the clinic today.            Electronically Signed by: NAHID Frankel -Author on June 8, 2018 04:13:13 PM

## 2021-05-07 NOTE — PROGRESS NOTES
Progress Note      Patient Name: Danilo Crane   Patient ID: [id number]   Sex: Male   YOB: 1970        Visit Date: December 18, 2020    Provider: NAHID Baig   Location: [facility]   Location Address: [address] [place] [address] [place]  [address]   Location Phone: [phone]          Chief Complaint     medication refills       History Of Present Illness  Danilo Crane is a 50 year old /White male who presents for evaluation and treatment of:      follow up of chronic health conditions and to refill meds.    He thinks he has been having small bouts with anxiety. Heart racing, gets jittery. 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 Sleeping ok. Due to marital situation. Currently living at his home. All children grown. He thinks things will get better. Currently . Reports a hx of minor anxiety episodes that don't last long. Has some friends for support. Has willis in [place].     HTN: Has not been routinely checking BP. No persistent headaches, no dizziness, chest pain. Maybe slight swelling at end of the day.  Does experience leg cramps. Wears compression socks,    GERD: uses OTC PPI fro symptoms- stable    Hyperlipidemia: no problems with meds. Has been eating out more - does not choose bread but likes potatoes. No soda's and avoids most sweets.    Bowel habits have changed, eating out a lot, seeing more diarrhea. Seeing a little more diarrhea.    Elevated Blood Sugar- noted to be 125  on labs 6/2020    Uses smokeless tobacco- since teenager. Not motivated to stop at this time.           Past Medical History  Disease Name Date Onset Notes   Allergic rhinitis --  --    Essential hypertension 03/13/2020 --    High blood pressure --  --    Hyperlipidemia --  --          Past Surgical History  Procedure Name Date Notes   I have had no surgeries --  --          Medication List  Name Date Started Instructions    lisinopril-hydrochlorothiazide 20-12.5 mg oral tablet 12/18/2020 take 1 tablet by oral route once daily for 90 days   rosuvastatin 10 mg oral tablet 12/18/2020 take 1 tablet (10 mg) by oral route once daily for 90 days         Allergy List  Allergen Name Date Reaction Notes   NO KNOWN DRUG ALLERGIES --  --  --          Family Medical History  Disease Name Relative/Age Notes   Malignant neoplasm  --          Social History  Finding Status Start/Stop Quantity Notes   Alcohol Light --/-- --  --    Tobacco Current every day --/-- 1/2 can a day 1/2 can per day         Review of Systems  · Constitutional  o Admits  o : good general health lately  o Denies  o : fatigue, headache  · Eyes  o Admits  o : blurred vision  · HENT  o Denies  o : nasal congestion, sore throat, ear pain  · Cardiovascular  o Admits  o : palpitations  o Denies  o : chest pain  · Respiratory  o Denies  o : shortness of breath, wheezing, cough  · Gastrointestinal  o Admits  o : diarrhea, reflux  o Denies  o : nausea, vomiting  · Psychiatric  o Admits  o : anxiety  o Denies  o : depression, difficulty sleeping      Vitals  Date Time BP Position Site L\R Cuff Size HR RR TEMP (F) WT  HT  BMI kg/m2 BSA m2 O2 Sat FR L/min FiO2 HC       12/18/2020 01:42 /84 Sitting    106 - R  97.3 171lbs 7oz    96 %  21%          Physical Examination  · Constitutional  o Appearance  o : well developed, well-nourished, in no acute distress  · Eyes  o Conjunctivae  o : conjunctivae normal  · Neck  o Inspection/Palpation  o : supple  · Respiratory  o Respiratory Effort  o : breathing unlabored  o Auscultation of Lungs  o : clear to ascultation  · Cardiovascular  o Heart  o :   § Auscultation of Heart  § : regular rate and rhythm  o Peripheral Vascular System  o :   § Extremities  § : no edema  · Gastrointestinal  o Abdominal Examination  o :   § Abdomen  § : soft non tender  · Musculoskeletal  o General  o :   § General Musculoskeletal  § : No joint swelling or  deformity., Muscle tone, strength, and development grossly normal.  · Skin and Subcutaneous Tissue  o General Inspection  o : no lesions present, no areas of discoloration, skin turgor normal, texture normal  · Neurologic  o Gait and Station  o :   § Gait Screening  § : normal gait  · Psychiatric  o Mood and Affect  o : mood normal, affect appropriate          Results     PSA reviewed 6/2020       Assessment  · Need for influenza vaccination     V04.81/Z23  · GERD (gastroesophageal reflux disease)     530.81/K21.9  · Hyperlipidemia     272.4/E78.5  · Nicotine dependence     305.1/F17.200  · Tobacco abuse counseling       Tobacco abuse counseling     V65.42/Z71.6  · High blood pressure     401.9/I10  · Prediabetes     790.29/R73.03  · Anxiety     300.00/F41.9      Plan  · Orders  o Immunization Admin Fee (Single) ([facility]) (32566) - V04.81/Z23 - 12/18/2020  o Smoking cessation counseling, 3-10 minutes [facility] (33969) - V65.42/Z71.6 - 12/18/2020  o ACO-17: Screened for tobacco use AND received tobacco cessation intervention (4004F) - V65.42/Z71.6 - 12/18/2020  o CMP [facility] (75741) - 401.9/I10 - 12/18/2020  o Hgb A1c [facility] (05881) - 790.29/R73.03 - 12/18/2020  o Lipid Panel [facility] (58570) - 272.4/E78.5 - 12/18/2020  o ACO-39: Current medications updated and reviewed (1159F, ) - - 12/18/2020  o Fluzone Quadrivalent Vaccine, age 3+ (65503) - V04.81/Z23 - 12/18/2020   Vaccine - Influenza; Dose: 0.5; Site: Left Arm; Route: Intramuscular; Date: 12/18/2020 14:29:00; Exp: 06/30/2021; Lot: [id number]; Mfg: sanMaximus pasteur; TradeName: FLUZONE; Administered By: Mili Hercules MA; Comment: [id number]  · Medications  o hydroxyzine HCl 10 mg oral tablet   SIG: take 1 tablet by oral route TID prn anxiety   DISP: (30) Tablet with 1 refills  Prescribed on 12/18/2020     o lisinopril-hydrochlorothiazide 20-12.5 mg oral tablet   SIG: take 1 tablet by oral route once daily for 90 days   DISP: (90) Tablet with 1 refills  Refilled on 12/18/2020      o rosuvastatin 10 mg oral tablet   SIG: take 1 tablet (10 mg) by oral route once daily for 90 days   DISP: (90) Tablet with 1 refills  Refilled on 12/18/2020     o esomeprazole magnesium 20 mg oral capsule,delayed release(DR/EC)   SIG: take 1 capsule (20 mg) by oral route once daily at least 1 hour before a meal   DISP: (90) capsule with 1 refills  Discontinued on 12/18/2020     o Medications have been Reconciled  o Transition of Care or Provider Policy  · Instructions  o Advised that cheeses and other sources of dairy fats, animal fats, fast food, and the extras (candy, pasteries, pies, doughnuts and cookies) all contain LDL raising nutrients. Advised to increase fruits, vegetables, whole grains, and to monitor portion sizes.   o *Form of nicotine being used: smokeless  o Patient was strongly encouraged to discontinue use of any nicotine containing product or minimize the use of the product.  o Discussed smoking cessation and counseling with patient for over 3 minutes.  o Tobacco and smoking cessation counseling for more than 3 minutes was completed.  o Patient was educated/instructed on their diagnosis, treatment and medications prior to discharge from the clinic today.  o Patient counseled to stop smoking.  o Discussed need to be intentional on not overdoing CHO's , especially with eating out more  o Discussed possible side effects of hydroxyzine- especially drowsiness  o Encouraged to stop tobacco use  o Loose stool could be related to diet changes or anxiety. Encouraged better diet.            Electronically Signed by: NAHID Baig -Author on December 18, 2020 02:52:17 PM

## 2021-05-07 NOTE — PROGRESS NOTES
Progress Note      Patient Name: Danilo Crane   Patient ID: [id number]   Sex: Male   YOB: 1970        Visit Date: March 13, 2020    Provider: NAHID Frankel   Location: [facility]   Location Address: [address] [place] [address] [place]  [address]   Location Phone: [phone]          Chief Complaint     refills and labs, patient is fasting       History Of Present Illness  Danilo Crane is a 49 year old /White male who presents for evaluation and treatment of:      refills of medications and fasting labs    GERD: he is buying omeprazole OTC and works fairly well for symptoms - no additional antacids needed     HTN: stable in office - he was checking regularly at home due to having chest pains and went to ER and states he was cleared from heart concern - trying to eat healthier and exercise some - weight is down some and feels better     Hyperlipidemia: having some nighttime cramping in the feet       Past Medical History  Disease Name Date Onset Notes   Allergic rhinitis --  --    Essential hypertension 03/13/2020 --    High blood pressure --  --    Hyperlipidemia --  --          Past Surgical History  Procedure Name Date Notes   I have had no surgeries --  --          Medication List  Name Date Started Instructions   esomeprazole magnesium 20 mg oral capsule,delayed release(/EC) 06/08/2018 take 1 capsule (20 mg) by oral route once daily at least 1 hour before a meal   lisinopril-hydrochlorothiazide 20-12.5 mg oral tablet 09/16/2019 take 1 tablet by oral route once daily for 90 days   rosuvastatin 10 mg oral tablet 09/16/2019 take 1 tablet (10 mg) by oral route once daily for 90 days         Allergy List  Allergen Name Date Reaction Notes   NO KNOWN DRUG ALLERGIES --  --  --        Allergies Reconciled  Family Medical History  Disease Name Relative/Age Notes   Malignant neoplasm  --          Social History  Finding Status Start/Stop Quantity Notes  "  Alcohol Light --/-- --  --    Tobacco Current every day --/-- 1/2 can a day 1/2 can per day         Review of Systems  · Constitutional  o Admits  o : occasional headache  o Denies  o : fever, chills, body aches  · Cardiovascular  o Denies  o : chest pain, lower extremity edema, palpitations  · Respiratory  o Denies  o : shortness of breath, wheezing, cough  · Neurologic  o Admits  o : dizziness with standing quickly   o Denies  o : loss of balance  · Musculoskeletal  o * See HPI      Vitals  Date Time BP Position Site L\R Cuff Size HR RR TEMP (F) WT  HT  BMI kg/m2 BSA m2 O2 Sat HC       03/13/2020 09:29 /74 Sitting    90 - R  97 170lbs 0oz 5'  7.5\" 26.23 1.92 97 %          Physical Examination  · Constitutional  o Appearance  o : well developed, well-nourished, in no acute distress  · Eyes  o Conjunctivae  o : conjunctivae normal  o Pupils and Irises  o : pupils equal and round, pupils reactive to light bilaterally  · Neck  o Inspection/Palpation  o : supple  o Thyroid  o : no thyromegaly  · Respiratory  o Respiratory Effort  o : breathing unlabored  o Auscultation of Lungs  o : clear to ascultation  · Cardiovascular  o Heart  o :   § Auscultation of Heart  § : regular rate and rhythm  o Peripheral Vascular System  o :   § Extremities  § : no edema  · Lymphatic  o Neck  o : no lymphadenopathy present  · Musculoskeletal  o General  o :   § General Musculoskeletal  § : No joint swelling or deformity. Muscle tone, strength, and development grossly normal.  · Skin and Subcutaneous Tissue  o General Inspection  o : NL tone  · Neurologic  o Gait and Station  o :   § Gait Screening  § : normal gait  · Psychiatric  o Mood and Affect  o : mood normal, affect appropriate              Assessment  · Essential hypertension     401.9/I10  · GERD (gastroesophageal reflux disease)     530.81/K21.9  · Hyperlipidemia     272.4/E78.5    Problems Reconciled  Plan  · Orders  o CBC with Auto Diff [facility] (45044) - 401.9/I10 - " 03/13/2020  o CMP [facility] (10505) - 401.9/I10 - 03/13/2020  o Lipid Panel [facility] (34004) - 272.4/E78.5 - 03/13/2020  o Urinalysis with Reflex Microscopy if abnormal ([facility]) (47381) - 401.9/I10 - 03/13/2020  o ACO-17: Screened for tobacco use AND received tobacco cessation intervention (4004F) - - 03/13/2020  o ACO-39: Current medications updated and reviewed () - - 03/13/2020  · Medications  o lisinopril-hydrochlorothiazide 20-12.5 mg oral tablet   SIG: take 1 tablet by oral route once daily for 90 days   DISP: (90) tablets with 1 refills  Adjusted on 03/13/2020     o rosuvastatin 10 mg oral tablet   SIG: take 1 tablet (10 mg) by oral route once daily for 90 days   DISP: (90) Tablet with 1 refills  Adjusted on 03/13/2020     o Medications have been Reconciled  o Transition of Care or Provider Policy  · Instructions  o Advised that cheeses and other sources of dairy fats, animal fats, fast food, and the extras (candy, pasteries, pies, doughnuts and cookies) all contain LDL raising nutrients. Advised to increase fruits, vegetables, whole grains, and to monitor portion sizes.   o Take all medications as prescribed/directed.  o Patient was educated/instructed on their diagnosis, treatment and medications prior to discharge from the clinic today.  o Chronic conditions reviewed and taken into consideration for today's treatment plan.  · Disposition  o Follow up as needed.            Electronically Signed by: NAHID Frankel -Author on March 13, 2020 09:53:59 AM

## 2021-05-07 NOTE — PROGRESS NOTES
Progress Note      Patient Name: Brent Crane   Patient ID: [id number]   Sex: Male   YOB: 1970        Visit Date: January 28, 2019    Provider: DINA De La O   Location: [facility] [place]   Location Address: [address] [place] [address]  [id number]   Location Phone: [phone]          Chief Complaint     PATIENT IS HERE FOR A 6 MONTH CHECK UP, MEDICATION REFILLS AND HIS LAB WORK.  PATIENT IS FASTING.       History Of Present Illness  Brent Crane is a 48 year old /White male who presents for evaluation and treatment of:      pt here for the F/U on refills and is fasting--and pt also been tot he cardiologist and they were adding the HCTZ to the Lisinopril and pt has not started this--and cardiologist told him to just F/U and DW PCP--and pt making LSC's and monitoring BP at home and running good/normal range    pt sees DR Vizcarra    HTN-stable  GERD-stable--on the low dose OTC--  Cholesterol-stable    no males in his family with any prostate issues    does not take the flu shot    last depression screen was in 2017    and noticed hoarseness and getting worse over the last 1-2 months--but had this for months on and off--used tobacco and smokeless tobacco--does not go to the dentists regularly     up at time to urinate     pre-diabetes range and pt trying to watch diet     and pt has noticed after eating a meal will cough and then finally cough up a little phlegm and then is fine after that-    sometimes up couple times at night to urinate -       Past Medical History  Disease Name Date Onset Notes   Allergic rhinitis --  --    High blood pressure --  --    Hyperlipidemia --  --          Past Surgical History  Procedure Name Date Notes   I have had no surgeries --  --          Medication List  Name Date Started Instructions   esomeprazole magnesium 20 mg oral capsule,delayed release(/EC) 06/08/2018 take 1 capsule (20 mg) by oral route once daily  at least 1 hour before a meal   lisinopril 10 mg oral tablet 06/08/2018 take 1 tablet (10 mg) by oral route once daily ( along with 5 mg total dose 15 mg daily)   lisinopril 5 mg oral tablet 11/21/2018 take 1 tablet (5 mg) by oral route once daily with the 10 mg total dose 15 mg daily   rosuvastatin 10 mg oral tablet 11/21/2018 take 1 tablet (10 mg) by oral route once daily for 90 days         Allergy List  Allergen Name Date Reaction Notes   NO KNOWN DRUG ALLERGIES --  --  --          Family Medical History  Disease Name Relative/Age Notes   Malignant neoplasm / --          Social History  Finding Status Start/Stop Quantity Notes   Alcohol Light --/-- --  --    Tobacco Current every day --/-- 3/4 can a day 3/4 can per day         Review of Systems  · Constitutional  o Denies  o : fatigue, fever  · HENT  o Admits  o : nasal congestion, nasal discharge, hoarseness  o Denies  o : headaches, vertigo, recent head injury  · Cardiovascular  o Denies  o : chest pain, irregular heart beats  · Respiratory  o Denies  o : shortness of breath, productive cough  · Gastrointestinal  o Denies  o : nausea, vomiting, heartburn, abdominal pain, blood in stools  · Genitourinary  o Admits  o : nocturia  o Denies  o : dysuria, urinary retention, decreased stream  · Integument  o Denies  o : rash, hair growth change, new skin lesions  · Neurologic  o Denies  o : altered mental status, tingling or numbness, memory difficulties, seizures, loss of balance  · Musculoskeletal  o Denies  o : joint swelling, limitation of motion  · Endocrine  o Denies  o : cold intolerance, heat intolerance  · Psychiatric  o Denies  o : suicidal ideation, homicidal ideation  · Heme-Lymph  o Denies  o : petechiae, lymph node enlargement or tenderness  · Allergic-Immunologic  o Denies  o : frequent illnesses      Vitals  Date Time BP Position Site L\R Cuff Size HR RR TEMP(F) WT  HT  BMI kg/m2 BSA m2 O2 Sat        01/28/2019 09:35 /82 Sitting    88 - R   "97.3 174lbs 7oz 5'  7.5\" 26.92 1.94 97 %           Physical Examination  · Constitutional  o Appearance  o : well developed, well-nourished, in no acute distress  · Head and Face  o HEENT  o : Unremarkable  · Eyes  o Conjunctivae  o : conjunctivae normal  · Neck  o Inspection/Palpation  o : supple  o Thyroid  o : no thyromegaly  · Respiratory  o Respiratory Effort  o : breathing unlabored  o Auscultation of Lungs  o : clear to ascultation  · Cardiovascular  o Heart  o :   § Auscultation of Heart  § : regular rate and rhythm  o Peripheral Vascular System  o :   § Extremities  § : no edema  · Gastrointestinal  o Abdominal Examination  o :   § Abdomen  § : soft nontender  · Lymphatic  o Neck  o : no lymphadenopathy present  · Musculoskeletal  o General  o :   § General Musculoskeletal  § : No joint swelling or deformity., Muscle tone, strength, and development grossly normal.  · Skin and Subcutaneous Tissue  o General Inspection  o : skin turgor normal, texture normal  · Neurologic  o Gait and Station  o :   § Gait Screening  § : normal gait  · Psychiatric  o Mood and Affect  o : mood normal, affect appropriate          Results     reviewed the labs       Assessment  · Cough     786.2/R05  · Essential hypertension     401.9/I10  · GERD (gastroesophageal reflux disease)     530.81/K21.9  · Hyperlipidemia     272.4/E78.5  · Tobacco abuse counseling       Tobacco abuse counseling     V65.42/Z71.6  · Smokeless tobacco use     305.1/Z72.0  · Hoarseness     784.42/R49.0  · Nocturia     788.43/R35.1  · Screening for prostate cancer     V76.44/Z12.5  · Pre-diabetes     790.29/R73.03      Plan  · Orders  o CBC with Auto Diff [facility] (87919) - 401.9/I10 - 01/28/2019  o CMP [facility] (38278) - 401.9/I10 - 01/28/2019  o Lipid Panel [facility] (48572) - 272.4/E78.5 - 01/28/2019  o Smoking cessation counseling, 3-10 minutes [facility] (51890) - V65.42/Z71.6 - 01/28/2019   smokeless tobacco   o ACO-17: Screened for tobacco use AND received tobacco cessation " intervention (4004F) - V65.42/Z71.6 - 01/28/2019  o Hgb A1c [facility] (24978) - 790.29/R73.03 - 01/28/2019  o Urinalysis with Reflex Microscopy if abnormal ([facility]) (14254) - 401.9/I10, 788.43/R35.1, 790.29/R73.03 - 01/28/2019  o Urine microalbumin (47209) - 401.9/I10, 790.29/R73.03 - 01/28/2019  o ACO-14: Influenza immunization was not administered for reasons documented () - - 01/28/2019  o ACO-18: Negative screen for clinical depression using a standardized tool () - - 01/28/2019  o ACO-27: Most recent 2017 HgbA1c less than 7 [facility] (3044F) - - 01/28/2019  o ACO-39: Current medications updated and reviewed () - - 01/28/2019  o PSA Ultrasensitive, ANNUAL SCREENING [facility] (93996) - 788.43/R35.1, V76.44/Z12.5 - 01/28/2019  o ENT CONSULTATION ([facility]) - 305.1/Z72.0, 784.42/R49.0, 530.81/K21.9, 786.2/R05 - 01/28/2019  · Medications  o lisinopril 10 mg oral tablet   SIG: take 1 tablet (10 mg) by oral route once daily ( along with 5 mg total dose 15 mg daily)   DISP: (90) tablets with 1 refills  Adjusted on 01/28/2019     o lisinopril 5 mg oral tablet   SIG: take 1 tablet (5 mg) by oral route once daily with the 10 mg total dose 15 mg daily   DISP: (90) Tablet with 1 refills  Adjusted on 01/28/2019     o rosuvastatin 10 mg oral tablet   SIG: take 1 tablet (10 mg) by oral route once daily for 90 days   DISP: (90) Tablet with 1 refills  Adjusted on 01/28/2019     · Instructions  o Patient advised to monitor blood pressure (B/P) at home and journal readings. Patient informed that a B/P reading at home of more than 135/85 is considered hypertension. For readings greater btrl463/90 or higher patient is advised to follow up in the office with readings for management. Patient advised to limit sodium intake.  o Maintain a healthy weight. Avoid tight fitting clothes. Avoid fried, fatty foods, tomato sauce, chocolate, mint, garlic, onion, alcohol. caffeine. Eat smaller meals, dont lie down after a meal, dont smoke. Elevate the  head of your bed 6-9 inches.  o Recommended exercise program to assist with cholesterol, weight loss and overall health improvement.  o Advised that cheeses and other sources of dairy fats, animal fats, fast food, and the extras (candy, pasteries, pies, doughnuts and cookies) all contain LDL raising nutrients. Advised to increase fruits, vegetables, whole grains, and to monitor portion sizes.   o Tobacco and smoking cessation counseling for more than 3 minutes was completed.  o Take all medications as prescribed/directed.  o Patient was educated/instructed on their diagnosis, treatment and medications prior to discharge from the clinic today.  o Patient instructed to seek medical attention urgently for new or worsening symptoms.  o Call the office with any concerns or questions.  o Chronic conditions reviewed and taken into consideration for today's treatment plan.  o also advised pt to go for regular dental exams for the oral screening they do and to try to stop all smokeless tobacco use  · Disposition  o Call or Return if symptoms worsen or persist.  o Return Visit Request in/on 6 months +/- 2 days (2095).            Electronically Signed by: DINA De La O -Author on January 28, 2019 10:09:51 AM

## 2021-05-07 NOTE — PROGRESS NOTES
Progress Note      Patient Name: Danilo Crane   Patient ID: [id number]   Sex: Male   YOB: 1970        Visit Date: March 11, 2021    Provider: NAHID Frankel   Location: [facility]   Location Address: [address] [place] [address] [address], [place]  [address]   Location Phone: [phone]          Chief Complaint     pain in upper right shoulder, at the top, possible pinch nerve in neck       History Of Present Illness  Danilo Crane is a 50 year old /White male who presents for evaluation and treatment of:      right upper arm pain with radiation down to the elbow x 1 week and worsening - pt is able to push, pull, and lift, but pain is worse at rest - stretching neck to left shoulder will provide short-term relief - had to leave work early yesterday - past 2 night could not get any relief from the pain and unable to sleep - pain is worse with driving - has been taking Excedrin extra strenght with very little relief - pain is described as a sharp pain from mid-neck down to shoulder blade and down to elbow - axillary pain and feels like someone is pulling on arm     Similar symptoms in past and improved with PT  No muscle relaxers taken - took an old pain medication that did not help (Hydrocodone)       Past Medical History  Disease Name Date Onset Notes   Allergic rhinitis --  --    Essential hypertension 03/13/2020 --    High blood pressure --  --    Hyperlipidemia --  --          Past Surgical History  Procedure Name Date Notes   I have had no surgeries --  --          Medication List  Name Date Started Instructions   hydroxyzine HCl 10 mg oral tablet 12/19/2020 take 1 tablet by oral route TID prn anxiety   lisinopril-hydrochlorothiazide 20-12.5 mg oral tablet 12/19/2020 take 1 tablet by oral route once daily for 90 days   rosuvastatin 10 mg oral tablet 12/19/2020 take 1 tablet (10 mg) by oral route once daily for 90 days         Allergy List  Allergen Name Date Reaction  "Notes   NO KNOWN DRUG ALLERGIES --  --  --        Allergies Reconciled  Family Medical History  Disease Name Relative/Age Notes   Malignant neoplasm  --          Social History  Finding Status Start/Stop Quantity Notes   Alcohol Light --/-- --  --    Tobacco Current every day --/-- 1/2 can a day 1/2 can per day         Immunizations  NameDate Admin Mfg Trade Name Lot Number Route Inj VIS Given VIS Publication   Rtyocuuqa48/18/2020 PMC FLUZONE [id number] IM LA 12/18/2020 08/15/2019   Comments: [id number]         Review of Systems  · Constitutional  o Denies  o : fever, chills, body aches  · Cardiovascular  o Denies  o : chest pain, palpitations  · Respiratory  o Denies  o : shortness of breath, wheezing, cough      Vitals  Date Time BP Position Site L\R Cuff Size HR RR TEMP (F) WT  HT  BMI kg/m2 BSA m2 O2 Sat FR L/min FiO2 HC       03/11/2021 09:58 /80 Sitting    79 - R  97.1 172lbs 0oz 5'  7.5\" 26.54 1.93 96 %  21%          Physical Examination  · Constitutional  o Appearance  o : well developed, well-nourished, in no acute distress  · Eyes  o Conjunctivae  o : conjunctivae normal  o Pupils and Irises  o : pupils equal and round, pupils reactive to light bilaterally  · Neck  o Inspection/Palpation  o : supple  o Thyroid  o : no thyromegaly  · Respiratory  o Respiratory Effort  o : breathing unlabored  o Auscultation of Lungs  o : clear to ascultation  · Cardiovascular  o Heart  o :   § Auscultation of Heart  § : regular rate and rhythm  o Peripheral Vascular System  o :   § Extremities  § : no edema  · Lymphatic  o Neck  o : no lymphadenopathy present  · Musculoskeletal  o General  o :   § General Musculoskeletal  § : No joint swelling or deformity. Muscle tone, strength, and development grossly normal.  · Skin and Subcutaneous Tissue  o General Inspection  o : NL tone  · Neurologic  o Gait and Station  o :   § Gait Screening  § : normal gait  · Psychiatric  o Mood and Affect  o : mood normal, affect " appropriate          Assessment  · Cervical pain (neck)     723.1/M54.2  · Radiculopathy     729.2/M54.10      Plan  · Orders  o ACO-39: Current medications updated and reviewed (1159F, ) - - 03/11/2021  o PHYSICAL THERAPY CONSULTATION ([facility]) - 723.1/M54.2 - 03/11/2021   pain with radiation to the right elbow  · Medications  o diclofenac sodium 75 mg oral tablet,delayed release (DR/EC)   SIG: take 1 tablet (75 mg) by oral route 2 times per day for 10 days   DISP: (20) Tablet with 0 refills  Prescribed on 03/11/2021     o Medications have been Reconciled  o Transition of Care or Provider Policy  · Instructions  o Take all medications as prescribed/directed.  o Patient was educated/instructed on their diagnosis, treatment and medications prior to discharge from the clinic today.  o Notify with no improvement.   · Disposition  o Follow up as needed.            Electronically Signed by: NAHID Frankel -Author on March 11, 2021 10:57:29 AM

## 2021-05-07 NOTE — PROGRESS NOTES
Progress Note      Patient Name: Brent Crane   Patient ID: [id number]   Sex: Male   YOB: 1970        Visit Date: March 12, 2019    Provider: NAHID Frankel   Location: [facility]   Location Address: [address] [place] [address]  [id number]   Location Phone: [phone]          Chief Complaint     RT shoulder pain for 12 to 14 days       History Of Present Illness  Brent Crane is a 48 year old /White male who presents for evaluation and treatment of:      right shoulder pain for about 2 weeks - pt states he had to dig a garbage can out of a sink hole - he had to dig the mud out and then hoist the can out of the sink hole - pain started about 3 days after trying to lift the can out of the sink hole - his index and middle finger are now tingling x 3-4 days - pain worsens with sitting and driving - pain starts at the lat next to the neck and down around the posterior shoulder and around the shoulder joint space - used a cooling device to the shoulder yesterday and helped some - decreased sleep due to the pain - pain is described as sharp and stabbing pain     has taken Excedrin for the pain - yesterday pain was much worse     Went to Chiropractor about 1 week ago       Past Medical History  Disease Name Date Onset Notes   Allergic rhinitis --  --    High blood pressure --  --    Hyperlipidemia --  --          Past Surgical History  Procedure Name Date Notes   I have had no surgeries --  --          Medication List  Name Date Started Instructions   esomeprazole magnesium 20 mg oral capsule,delayed release(/EC) 06/08/2018 take 1 capsule (20 mg) by oral route once daily at least 1 hour before a meal   lisinopril 10 mg oral tablet 01/28/2019 take 1 tablet (10 mg) by oral route once daily ( along with 5 mg total dose 15 mg daily)   lisinopril 5 mg oral tablet 01/28/2019 take 1 tablet (5 mg) by oral route once daily with the 10 mg total dose 15 mg  daily   rosuvastatin 10 mg oral tablet 01/28/2019 take 1 tablet (10 mg) by oral route once daily for 90 days         Allergy List  Allergen Name Date Reaction Notes   NO KNOWN DRUG ALLERGIES --  --  --          Family Medical History  Disease Name Relative/Age Notes   Malignant neoplasm / --          Social History  Finding Status Start/Stop Quantity Notes   Alcohol Light --/-- --  --    Tobacco Current every day --/-- 3/4 can a day 3/4 can per day         Review of Systems  · Constitutional  o Denies  o : fever, chills, body aches  · Respiratory  o Denies  o : shortness of breath, wheezing, cough  · Gastrointestinal  o Denies  o : nausea, vomiting, diarrhea  · Musculoskeletal  o Admits  o : joint pain, muscle pain, shoulder pain  o Denies  o : joint swelling, limitation of motion, muscular weakness      Vitals  Date Time BP Position Site L\R Cuff Size HR RR TEMP(F) WT  HT  BMI kg/m2 BSA m2 O2 Sat HC       03/12/2019 09:59 /80 Sitting    97 - R  97.6 173lbs 0oz    98 %           Physical Examination  · Constitutional  o Appearance  o : well developed, well-nourished, in no acute distress  · Eyes  o Conjunctivae  o : conjunctivae normal  o Pupils and Irises  o : pupils equal and round, pupils reactive to light bilaterally  · Neck  o Inspection/Palpation  o : supple  o Thyroid  o : no thyromegaly  · Respiratory  o Respiratory Effort  o : breathing unlabored  o Auscultation of Lungs  o : clear to ascultation  · Cardiovascular  o Heart  o :   § Auscultation of Heart  § : regular rate and rhythm  o Peripheral Vascular System  o :   § Extremities  § : no edema  · Lymphatic  o Neck  o : no lymphadenopathy present  · Musculoskeletal  o General  o :   § General Musculoskeletal  § : right posterior shoulder pain with palpation and with ROM. Anterior shoulder/later chest muscle pain with straight arm raise against resistance. No joint swelling or deformity. Muscle tone, strength, and development grossly normal.  · Skin  and Subcutaneous Tissue  o General Inspection  o : NL tone  · Neurologic  o Gait and Station  o :   § Gait Screening  § : normal gait  · Psychiatric  o Mood and Affect  o : mood normal, affect appropriate              Assessment  · Right shoulder pain     719.41/M25.511      Plan  · Orders  o ACO-39: Current medications updated and reviewed () - - 03/12/2019  o Shoulder (Right) [facility] ([id number]) - 719.41/M25.511 - 03/12/2019  · Medications  o prednisone 5 mg oral tablets,dose pack   SIG: take as directed for 6 days   DISP: (1) 21 ct dose-pack with 0 refills  Prescribed on 03/12/2019     o cyclobenzaprine 10 mg oral tablet   SIG: take 1 tablet (10 mg) by oral route 3 times per day for 10 days   DISP: (30) tablets with 0 refills  Prescribed on 03/12/2019     · Instructions  o Take all medications as prescribed/directed.  o Patient was educated/instructed on their diagnosis, treatment and medications prior to discharge from the clinic today.  o Rest, may use ice to the shoulder.   · Disposition  o Follow up as needed.            Electronically Signed by: NAHID Frankel -Author on March 12, 2019 11:03:40 AM

## 2021-05-09 VITALS
BODY MASS INDEX: 27.38 KG/M2 | TEMPERATURE: 97.3 F | SYSTOLIC BLOOD PRESSURE: 136 MMHG | HEART RATE: 88 BPM | DIASTOLIC BLOOD PRESSURE: 82 MMHG | OXYGEN SATURATION: 97 % | HEIGHT: 67 IN | WEIGHT: 174.44 LBS

## 2021-05-09 VITALS
TEMPERATURE: 91.4 F | DIASTOLIC BLOOD PRESSURE: 84 MMHG | SYSTOLIC BLOOD PRESSURE: 142 MMHG | OXYGEN SATURATION: 97 % | HEART RATE: 81 BPM | BODY MASS INDEX: 27.22 KG/M2 | WEIGHT: 169.37 LBS | HEIGHT: 66 IN

## 2021-05-09 VITALS
SYSTOLIC BLOOD PRESSURE: 144 MMHG | TEMPERATURE: 97.2 F | WEIGHT: 172 LBS | HEART RATE: 91 BPM | DIASTOLIC BLOOD PRESSURE: 92 MMHG | OXYGEN SATURATION: 98 %

## 2021-05-09 VITALS
TEMPERATURE: 97 F | HEIGHT: 67 IN | OXYGEN SATURATION: 97 % | BODY MASS INDEX: 26.68 KG/M2 | HEART RATE: 90 BPM | WEIGHT: 170 LBS | SYSTOLIC BLOOD PRESSURE: 112 MMHG | DIASTOLIC BLOOD PRESSURE: 74 MMHG

## 2021-05-09 VITALS
DIASTOLIC BLOOD PRESSURE: 84 MMHG | TEMPERATURE: 97.3 F | OXYGEN SATURATION: 96 % | HEART RATE: 106 BPM | SYSTOLIC BLOOD PRESSURE: 130 MMHG | WEIGHT: 171.44 LBS

## 2021-05-09 VITALS
WEIGHT: 175.37 LBS | SYSTOLIC BLOOD PRESSURE: 132 MMHG | OXYGEN SATURATION: 98 % | BODY MASS INDEX: 27.53 KG/M2 | HEART RATE: 86 BPM | HEIGHT: 67 IN | DIASTOLIC BLOOD PRESSURE: 88 MMHG | TEMPERATURE: 97.2 F

## 2021-05-09 VITALS
OXYGEN SATURATION: 100 % | TEMPERATURE: 97.6 F | HEART RATE: 107 BPM | SYSTOLIC BLOOD PRESSURE: 128 MMHG | DIASTOLIC BLOOD PRESSURE: 86 MMHG | WEIGHT: 175 LBS

## 2021-05-09 VITALS
SYSTOLIC BLOOD PRESSURE: 122 MMHG | WEIGHT: 172 LBS | HEIGHT: 67 IN | HEART RATE: 79 BPM | OXYGEN SATURATION: 96 % | DIASTOLIC BLOOD PRESSURE: 80 MMHG | TEMPERATURE: 97.1 F | BODY MASS INDEX: 27 KG/M2

## 2021-05-09 VITALS
HEART RATE: 97 BPM | WEIGHT: 173 LBS | DIASTOLIC BLOOD PRESSURE: 80 MMHG | OXYGEN SATURATION: 98 % | TEMPERATURE: 97.6 F | SYSTOLIC BLOOD PRESSURE: 130 MMHG

## 2021-05-15 VITALS
HEIGHT: 68 IN | RESPIRATION RATE: 16 BRPM | TEMPERATURE: 98.5 F | DIASTOLIC BLOOD PRESSURE: 76 MMHG | SYSTOLIC BLOOD PRESSURE: 144 MMHG | OXYGEN SATURATION: 98 % | HEART RATE: 86 BPM | WEIGHT: 175 LBS | BODY MASS INDEX: 26.52 KG/M2

## 2021-05-16 VITALS
SYSTOLIC BLOOD PRESSURE: 134 MMHG | WEIGHT: 176 LBS | DIASTOLIC BLOOD PRESSURE: 90 MMHG | HEIGHT: 67 IN | HEART RATE: 84 BPM | BODY MASS INDEX: 27.62 KG/M2

## 2021-05-16 VITALS
DIASTOLIC BLOOD PRESSURE: 88 MMHG | BODY MASS INDEX: 27 KG/M2 | HEIGHT: 67 IN | SYSTOLIC BLOOD PRESSURE: 156 MMHG | HEART RATE: 100 BPM | WEIGHT: 172 LBS

## 2021-09-27 ENCOUNTER — OFFICE VISIT (OUTPATIENT)
Dept: FAMILY MEDICINE CLINIC | Facility: CLINIC | Age: 51
End: 2021-09-27

## 2021-09-27 VITALS
OXYGEN SATURATION: 98 % | DIASTOLIC BLOOD PRESSURE: 84 MMHG | WEIGHT: 168 LBS | HEART RATE: 84 BPM | BODY MASS INDEX: 27 KG/M2 | HEIGHT: 66 IN | SYSTOLIC BLOOD PRESSURE: 120 MMHG | TEMPERATURE: 97.3 F

## 2021-09-27 DIAGNOSIS — E78.5 HYPERLIPIDEMIA, UNSPECIFIED HYPERLIPIDEMIA TYPE: ICD-10-CM

## 2021-09-27 DIAGNOSIS — Z12.11 ENCOUNTER FOR SCREENING FOR MALIGNANT NEOPLASM OF COLON: Primary | ICD-10-CM

## 2021-09-27 DIAGNOSIS — Z12.5 SCREENING FOR MALIGNANT NEOPLASM OF PROSTATE: ICD-10-CM

## 2021-09-27 DIAGNOSIS — I10 ESSENTIAL HYPERTENSION: ICD-10-CM

## 2021-09-27 DIAGNOSIS — R73.02 IMPAIRED GLUCOSE TOLERANCE: ICD-10-CM

## 2021-09-27 PROBLEM — R35.1 NOCTURIA: Status: ACTIVE | Noted: 2021-09-27

## 2021-09-27 PROBLEM — K21.9 GERD (GASTROESOPHAGEAL REFLUX DISEASE): Status: ACTIVE | Noted: 2021-09-27

## 2021-09-27 LAB
BACTERIA UR QL AUTO: NORMAL /HPF
BASOPHILS # BLD AUTO: 0.07 10*3/MM3 (ref 0–0.2)
BASOPHILS NFR BLD AUTO: 0.8 % (ref 0–1.5)
BILIRUB UR QL STRIP: NEGATIVE
CLARITY UR: CLEAR
COLOR UR: YELLOW
DEPRECATED RDW RBC AUTO: 44.8 FL (ref 37–54)
EOSINOPHIL # BLD AUTO: 0.28 10*3/MM3 (ref 0–0.4)
EOSINOPHIL NFR BLD AUTO: 3.2 % (ref 0.3–6.2)
ERYTHROCYTE [DISTWIDTH] IN BLOOD BY AUTOMATED COUNT: 13.3 % (ref 12.3–15.4)
GLUCOSE UR STRIP-MCNC: NEGATIVE MG/DL
HBA1C MFR BLD: 6.37 % (ref 4.8–5.6)
HCT VFR BLD AUTO: 53.4 % (ref 37.5–51)
HGB BLD-MCNC: 17.2 G/DL (ref 13–17.7)
HGB UR QL STRIP.AUTO: NEGATIVE
HYALINE CASTS UR QL AUTO: NORMAL /LPF
IMM GRANULOCYTES # BLD AUTO: 0.02 10*3/MM3 (ref 0–0.05)
IMM GRANULOCYTES NFR BLD AUTO: 0.2 % (ref 0–0.5)
KETONES UR QL STRIP: NEGATIVE
LEUKOCYTE ESTERASE UR QL STRIP.AUTO: ABNORMAL
LYMPHOCYTES # BLD AUTO: 2.5 10*3/MM3 (ref 0.7–3.1)
LYMPHOCYTES NFR BLD AUTO: 28.8 % (ref 19.6–45.3)
MCH RBC QN AUTO: 29.6 PG (ref 26.6–33)
MCHC RBC AUTO-ENTMCNC: 32.2 G/DL (ref 31.5–35.7)
MCV RBC AUTO: 91.8 FL (ref 79–97)
MONOCYTES # BLD AUTO: 0.69 10*3/MM3 (ref 0.1–0.9)
MONOCYTES NFR BLD AUTO: 7.9 % (ref 5–12)
NEUTROPHILS NFR BLD AUTO: 5.12 10*3/MM3 (ref 1.7–7)
NEUTROPHILS NFR BLD AUTO: 59.1 % (ref 42.7–76)
NITRITE UR QL STRIP: NEGATIVE
NRBC BLD AUTO-RTO: 0 /100 WBC (ref 0–0.2)
PH UR STRIP.AUTO: 6 [PH] (ref 5–8)
PLATELET # BLD AUTO: 322 10*3/MM3 (ref 140–450)
PMV BLD AUTO: 11 FL (ref 6–12)
PROT UR QL STRIP: NEGATIVE
RBC # BLD AUTO: 5.82 10*6/MM3 (ref 4.14–5.8)
RBC # UR: NORMAL /HPF
REF LAB TEST METHOD: NORMAL
SP GR UR STRIP: 1.02 (ref 1–1.03)
SQUAMOUS #/AREA URNS HPF: NORMAL /HPF
UROBILINOGEN UR QL STRIP: ABNORMAL
WBC # BLD AUTO: 8.68 10*3/MM3 (ref 3.4–10.8)
WBC UR QL AUTO: NORMAL /HPF

## 2021-09-27 PROCEDURE — 99214 OFFICE O/P EST MOD 30 MIN: CPT | Performed by: NURSE PRACTITIONER

## 2021-09-27 PROCEDURE — 81001 URINALYSIS AUTO W/SCOPE: CPT | Performed by: NURSE PRACTITIONER

## 2021-09-27 PROCEDURE — 84443 ASSAY THYROID STIM HORMONE: CPT | Performed by: NURSE PRACTITIONER

## 2021-09-27 PROCEDURE — 80053 COMPREHEN METABOLIC PANEL: CPT | Performed by: NURSE PRACTITIONER

## 2021-09-27 PROCEDURE — 83036 HEMOGLOBIN GLYCOSYLATED A1C: CPT | Performed by: NURSE PRACTITIONER

## 2021-09-27 PROCEDURE — 85025 COMPLETE CBC W/AUTO DIFF WBC: CPT | Performed by: NURSE PRACTITIONER

## 2021-09-27 PROCEDURE — G0103 PSA SCREENING: HCPCS | Performed by: NURSE PRACTITIONER

## 2021-09-27 PROCEDURE — 80061 LIPID PANEL: CPT | Performed by: NURSE PRACTITIONER

## 2021-09-27 RX ORDER — ROSUVASTATIN CALCIUM 10 MG/1
10 TABLET, COATED ORAL DAILY
COMMUNITY
Start: 2020-12-19 | End: 2021-09-27 | Stop reason: SDUPTHER

## 2021-09-27 RX ORDER — LISINOPRIL AND HYDROCHLOROTHIAZIDE 20; 12.5 MG/1; MG/1
1 TABLET ORAL DAILY
COMMUNITY
Start: 2020-12-19 | End: 2021-09-27 | Stop reason: SDUPTHER

## 2021-09-27 RX ORDER — LISINOPRIL AND HYDROCHLOROTHIAZIDE 20; 12.5 MG/1; MG/1
1 TABLET ORAL DAILY
Qty: 90 TABLET | Refills: 1 | Status: SHIPPED | OUTPATIENT
Start: 2021-09-27 | End: 2022-05-11 | Stop reason: SINTOL

## 2021-09-27 RX ORDER — ROSUVASTATIN CALCIUM 5 MG/1
5 TABLET, COATED ORAL DAILY
Qty: 90 TABLET | Refills: 1 | Status: SHIPPED | OUTPATIENT
Start: 2021-09-27 | End: 2022-05-11 | Stop reason: SDUPTHER

## 2021-09-27 NOTE — PROGRESS NOTES
Venipuncture Blood Specimen Collection  Venipuncture performed in left arm  by Meg Woody with good hemostasis. Patient tolerated the procedure well without complications.   09/27/21   Meg Woody

## 2021-09-28 LAB
ALBUMIN SERPL-MCNC: 5.2 G/DL (ref 3.5–5.2)
ALBUMIN/GLOB SERPL: 2.2 G/DL
ALP SERPL-CCNC: 46 U/L (ref 39–117)
ALT SERPL W P-5'-P-CCNC: 34 U/L (ref 1–41)
ANION GAP SERPL CALCULATED.3IONS-SCNC: 14.7 MMOL/L (ref 5–15)
AST SERPL-CCNC: 22 U/L (ref 1–40)
BILIRUB SERPL-MCNC: 0.4 MG/DL (ref 0–1.2)
BUN SERPL-MCNC: 15 MG/DL (ref 6–20)
BUN/CREAT SERPL: 14.9 (ref 7–25)
CALCIUM SPEC-SCNC: 10 MG/DL (ref 8.6–10.5)
CHLORIDE SERPL-SCNC: 99 MMOL/L (ref 98–107)
CHOLEST SERPL-MCNC: 135 MG/DL (ref 0–200)
CO2 SERPL-SCNC: 27.3 MMOL/L (ref 22–29)
CREAT SERPL-MCNC: 1.01 MG/DL (ref 0.76–1.27)
GFR SERPL CREATININE-BSD FRML MDRD: 79 ML/MIN/1.73
GLOBULIN UR ELPH-MCNC: 2.4 GM/DL
GLUCOSE SERPL-MCNC: 108 MG/DL (ref 65–99)
HDLC SERPL-MCNC: 39 MG/DL (ref 40–60)
LDLC SERPL CALC-MCNC: 74 MG/DL (ref 0–100)
LDLC/HDLC SERPL: 1.84 {RATIO}
POTASSIUM SERPL-SCNC: 4.5 MMOL/L (ref 3.5–5.2)
PROT SERPL-MCNC: 7.6 G/DL (ref 6–8.5)
PSA SERPL-MCNC: 1.97 NG/ML (ref 0–4)
SODIUM SERPL-SCNC: 141 MMOL/L (ref 136–145)
TRIGL SERPL-MCNC: 122 MG/DL (ref 0–150)
TSH SERPL DL<=0.05 MIU/L-ACNC: 1.03 UIU/ML (ref 0.27–4.2)
VLDLC SERPL-MCNC: 22 MG/DL (ref 5–40)

## 2022-05-11 ENCOUNTER — OFFICE VISIT (OUTPATIENT)
Dept: FAMILY MEDICINE CLINIC | Facility: CLINIC | Age: 52
End: 2022-05-11

## 2022-05-11 VITALS
HEART RATE: 79 BPM | DIASTOLIC BLOOD PRESSURE: 90 MMHG | BODY MASS INDEX: 26.52 KG/M2 | HEIGHT: 66 IN | SYSTOLIC BLOOD PRESSURE: 130 MMHG | TEMPERATURE: 97.3 F | OXYGEN SATURATION: 100 % | WEIGHT: 165 LBS

## 2022-05-11 DIAGNOSIS — K21.9 GASTROESOPHAGEAL REFLUX DISEASE, UNSPECIFIED WHETHER ESOPHAGITIS PRESENT: ICD-10-CM

## 2022-05-11 DIAGNOSIS — E78.5 HYPERLIPIDEMIA, UNSPECIFIED HYPERLIPIDEMIA TYPE: ICD-10-CM

## 2022-05-11 DIAGNOSIS — I10 ESSENTIAL HYPERTENSION: Primary | ICD-10-CM

## 2022-05-11 DIAGNOSIS — R53.83 FATIGUE, UNSPECIFIED TYPE: ICD-10-CM

## 2022-05-11 DIAGNOSIS — Z23 IMMUNIZATION DUE: ICD-10-CM

## 2022-05-11 DIAGNOSIS — Z12.11 ENCOUNTER FOR SCREENING FOR MALIGNANT NEOPLASM OF COLON: ICD-10-CM

## 2022-05-11 DIAGNOSIS — R73.02 IMPAIRED GLUCOSE TOLERANCE: ICD-10-CM

## 2022-05-11 LAB
ALBUMIN SERPL-MCNC: 4.6 G/DL (ref 3.5–5.2)
ALBUMIN/GLOB SERPL: 1.8 G/DL
ALP SERPL-CCNC: 52 U/L (ref 39–117)
ALT SERPL W P-5'-P-CCNC: 24 U/L (ref 1–41)
AMORPH URATE CRY URNS QL MICRO: ABNORMAL /HPF
ANION GAP SERPL CALCULATED.3IONS-SCNC: 10.6 MMOL/L (ref 5–15)
AST SERPL-CCNC: 16 U/L (ref 1–40)
BACTERIA UR QL AUTO: ABNORMAL /HPF
BASOPHILS # BLD AUTO: 0.06 10*3/MM3 (ref 0–0.2)
BASOPHILS NFR BLD AUTO: 0.9 % (ref 0–1.5)
BILIRUB SERPL-MCNC: 0.4 MG/DL (ref 0–1.2)
BILIRUB UR QL STRIP: NEGATIVE
BUN SERPL-MCNC: 16 MG/DL (ref 6–20)
BUN/CREAT SERPL: 14.2 (ref 7–25)
CALCIUM SPEC-SCNC: 9.7 MG/DL (ref 8.6–10.5)
CHLORIDE SERPL-SCNC: 102 MMOL/L (ref 98–107)
CHOLEST SERPL-MCNC: 158 MG/DL (ref 0–200)
CLARITY UR: ABNORMAL
CO2 SERPL-SCNC: 27.4 MMOL/L (ref 22–29)
COLOR UR: YELLOW
CREAT SERPL-MCNC: 1.13 MG/DL (ref 0.76–1.27)
DEPRECATED RDW RBC AUTO: 42.1 FL (ref 37–54)
EGFRCR SERPLBLD CKD-EPI 2021: 79.2 ML/MIN/1.73
EOSINOPHIL # BLD AUTO: 0.42 10*3/MM3 (ref 0–0.4)
EOSINOPHIL NFR BLD AUTO: 6.3 % (ref 0.3–6.2)
ERYTHROCYTE [DISTWIDTH] IN BLOOD BY AUTOMATED COUNT: 13 % (ref 12.3–15.4)
GLOBULIN UR ELPH-MCNC: 2.5 GM/DL
GLUCOSE SERPL-MCNC: 111 MG/DL (ref 65–99)
GLUCOSE UR STRIP-MCNC: NEGATIVE MG/DL
HBA1C MFR BLD: 6.3 % (ref 4.8–5.6)
HCT VFR BLD AUTO: 48.8 % (ref 37.5–51)
HDLC SERPL-MCNC: 39 MG/DL (ref 40–60)
HGB BLD-MCNC: 16.6 G/DL (ref 13–17.7)
HGB UR QL STRIP.AUTO: ABNORMAL
HYALINE CASTS UR QL AUTO: ABNORMAL /LPF
IMM GRANULOCYTES # BLD AUTO: 0.02 10*3/MM3 (ref 0–0.05)
IMM GRANULOCYTES NFR BLD AUTO: 0.3 % (ref 0–0.5)
KETONES UR QL STRIP: ABNORMAL
LDLC SERPL CALC-MCNC: 100 MG/DL (ref 0–100)
LDLC/HDLC SERPL: 2.54 {RATIO}
LEUKOCYTE ESTERASE UR QL STRIP.AUTO: ABNORMAL
LYMPHOCYTES # BLD AUTO: 1.87 10*3/MM3 (ref 0.7–3.1)
LYMPHOCYTES NFR BLD AUTO: 28 % (ref 19.6–45.3)
MCH RBC QN AUTO: 30.1 PG (ref 26.6–33)
MCHC RBC AUTO-ENTMCNC: 34 G/DL (ref 31.5–35.7)
MCV RBC AUTO: 88.6 FL (ref 79–97)
MONOCYTES # BLD AUTO: 0.64 10*3/MM3 (ref 0.1–0.9)
MONOCYTES NFR BLD AUTO: 9.6 % (ref 5–12)
NEUTROPHILS NFR BLD AUTO: 3.68 10*3/MM3 (ref 1.7–7)
NEUTROPHILS NFR BLD AUTO: 54.9 % (ref 42.7–76)
NITRITE UR QL STRIP: NEGATIVE
NRBC BLD AUTO-RTO: 0 /100 WBC (ref 0–0.2)
PH UR STRIP.AUTO: 5.5 [PH] (ref 5–8)
PLATELET # BLD AUTO: 257 10*3/MM3 (ref 140–450)
PMV BLD AUTO: 11 FL (ref 6–12)
POTASSIUM SERPL-SCNC: 4.6 MMOL/L (ref 3.5–5.2)
PROT SERPL-MCNC: 7.1 G/DL (ref 6–8.5)
PROT UR QL STRIP: ABNORMAL
RBC # BLD AUTO: 5.51 10*6/MM3 (ref 4.14–5.8)
RBC # UR STRIP: ABNORMAL /HPF
REF LAB TEST METHOD: ABNORMAL
SODIUM SERPL-SCNC: 140 MMOL/L (ref 136–145)
SP GR UR STRIP: 1.02 (ref 1–1.03)
SQUAMOUS #/AREA URNS HPF: ABNORMAL /HPF
TRIGL SERPL-MCNC: 100 MG/DL (ref 0–150)
UROBILINOGEN UR QL STRIP: ABNORMAL
VLDLC SERPL-MCNC: 19 MG/DL (ref 5–40)
WBC # UR STRIP: ABNORMAL /HPF
WBC NRBC COR # BLD: 6.69 10*3/MM3 (ref 3.4–10.8)

## 2022-05-11 PROCEDURE — 85025 COMPLETE CBC W/AUTO DIFF WBC: CPT | Performed by: NURSE PRACTITIONER

## 2022-05-11 PROCEDURE — 80053 COMPREHEN METABOLIC PANEL: CPT | Performed by: NURSE PRACTITIONER

## 2022-05-11 PROCEDURE — 99214 OFFICE O/P EST MOD 30 MIN: CPT | Performed by: NURSE PRACTITIONER

## 2022-05-11 PROCEDURE — 90715 TDAP VACCINE 7 YRS/> IM: CPT | Performed by: NURSE PRACTITIONER

## 2022-05-11 PROCEDURE — 84402 ASSAY OF FREE TESTOSTERONE: CPT | Performed by: NURSE PRACTITIONER

## 2022-05-11 PROCEDURE — 80061 LIPID PANEL: CPT | Performed by: NURSE PRACTITIONER

## 2022-05-11 PROCEDURE — 84403 ASSAY OF TOTAL TESTOSTERONE: CPT | Performed by: NURSE PRACTITIONER

## 2022-05-11 PROCEDURE — 81001 URINALYSIS AUTO W/SCOPE: CPT | Performed by: NURSE PRACTITIONER

## 2022-05-11 PROCEDURE — 90471 IMMUNIZATION ADMIN: CPT | Performed by: NURSE PRACTITIONER

## 2022-05-11 PROCEDURE — 83036 HEMOGLOBIN GLYCOSYLATED A1C: CPT | Performed by: NURSE PRACTITIONER

## 2022-05-11 RX ORDER — LISINOPRIL AND HYDROCHLOROTHIAZIDE 20; 12.5 MG/1; MG/1
1 TABLET ORAL DAILY
Qty: 90 TABLET | Refills: 1 | Status: CANCELLED | OUTPATIENT
Start: 2022-05-11

## 2022-05-11 RX ORDER — LISINOPRIL 20 MG/1
20 TABLET ORAL DAILY
Qty: 90 TABLET | Refills: 1 | Status: SHIPPED | OUTPATIENT
Start: 2022-05-11 | End: 2022-11-10 | Stop reason: SDUPTHER

## 2022-05-11 RX ORDER — ROSUVASTATIN CALCIUM 5 MG/1
5 TABLET, COATED ORAL DAILY
Qty: 90 TABLET | Refills: 1 | Status: SHIPPED | OUTPATIENT
Start: 2022-05-11 | End: 2022-11-10 | Stop reason: SDUPTHER

## 2022-05-11 NOTE — PROGRESS NOTES
Chief Complaint  Hyperlipidemia (Refills and labs, patient is fasting) and Hypertension    PHQ-2 Total Score: 0    Subjective        Past Medical History:   Diagnosis Date   • GERD (gastroesophageal reflux disease)    • Hyperlipidemia    • Hypertension      Social History     Tobacco Use   • Smoking status: Never Smoker   • Smokeless tobacco: Current User     Types: Chew   Vaping Use   • Vaping Use: Never used   Substance Use Topics   • Alcohol use: Yes     Comment: occasionally   • Drug use: Never      Current Outpatient Medications on File Prior to Visit   Medication Sig   • esomeprazole (nexIUM) 20 MG capsule Take 20 mg by mouth Daily.   • [DISCONTINUED] lisinopril-hydrochlorothiazide (PRINZIDE,ZESTORETIC) 20-12.5 MG per tablet Take 1 tablet by mouth Daily.   • [DISCONTINUED] rosuvastatin (CRESTOR) 5 MG tablet Take 1 tablet by mouth Daily.     No current facility-administered medications on file prior to visit.      No Known Allergies   Health Maintenance Due   Topic Date Due   • COLORECTAL CANCER SCREENING  Never done   • ANNUAL PHYSICAL  Never done   • ZOSTER VACCINE (1 of 2) Never done      Danilo Crane presents to [facility]  Here to follow up on chronic health conditions.     HTN: pt states that he has been cutting his medication in 1/2 due to frequent urination with HCTZ. Pt states he is unable to go to the restroom frequently while at work.     Hyperlipidemia: Patient denies side effects of Crestor.    GERD: Symptoms are stable with use of Nexium 20 mg over-the-counter.    Fatigue: Patient notes that he feels tired and sluggish especially after work and has to force himself to do things after work.  Patient would like to have his testosterone level checked.    No previous colonoscopy. No family hx of colon cancer. Denies changes in bowel habits.        Objective   Vital Signs:   /90 (BP Location: Left arm)   Pulse 79   Temp 97.3 °F (36.3 °C)   Ht 167.6 cm  "(66\")   Wt 74.8 kg (165 lb)   SpO2 100%   BMI 26.63 kg/m²     Review of Systems   Physical Exam  Vitals reviewed.   Constitutional:       General: He is not in acute distress.     Appearance: Normal appearance. He is well-developed.   Eyes:      Conjunctiva/sclera: Conjunctivae normal.      Pupils: Pupils are equal, round, and reactive to light.   Cardiovascular:      Rate and Rhythm: Normal rate and regular rhythm.      Heart sounds: Normal heart sounds.   Pulmonary:      Effort: Pulmonary effort is normal.      Breath sounds: Normal breath sounds.   Musculoskeletal:      Cervical back: Neck supple.      Right lower leg: No edema.      Left lower leg: No edema.   Skin:     General: Skin is warm and dry.   Neurological:      Mental Status: He is alert and oriented to person, place, and time.   Psychiatric:         Mood and Affect: Mood and affect normal.         Behavior: Behavior normal.         Thought Content: Thought content normal.         Judgment: Judgment normal.        Result Review :   The following data was reviewed by: NAHID Baron on 05/11/2022:  Common labs    Common Labsle 9/27/21 9/27/21 9/27/21 9/27/21 9/27/21    0847 0847 0847 0847 0847   Glucose   108 (A)     BUN   15     Creatinine   1.01     eGFR Non African Am   79     Sodium   141     Potassium   4.5     Chloride   99     Calcium   10.0     Albumin   5.20     Total Bilirubin   0.4     Alkaline Phosphatase   46     AST (SGOT)   22     ALT (SGPT)   34     WBC 8.68       Hemoglobin 17.2       Hematocrit 53.4 (A)       Platelets 322       Total Cholesterol    135    Triglycerides    122    HDL Cholesterol    39 (A)    LDL Cholesterol     74    Hemoglobin A1C  6.37 (A)      PSA     1.970   (A) Abnormal value                      Assessment and Plan    Diagnoses and all orders for this visit:    1. Essential hypertension (Primary)  -     CBC & Differential  -     Comprehensive Metabolic Panel  -     Urinalysis With Culture If Indicated " - Urine, Clean Catch  -     lisinopril (PRINIVIL,ZESTRIL) 20 MG tablet; Take 1 tablet by mouth Daily.  Dispense: 90 tablet; Refill: 1    2. Hyperlipidemia, unspecified hyperlipidemia type  -     rosuvastatin (CRESTOR) 5 MG tablet; Take 1 tablet by mouth Daily.  Dispense: 90 tablet; Refill: 1  -     Lipid Panel    3. Impaired glucose tolerance  -     Hemoglobin A1c    4. Gastroesophageal reflux disease, unspecified whether esophagitis present    5. Fatigue, unspecified type  -     Testosterone, Free, Total    6. Encounter for screening for malignant neoplasm of colon  -     Ambulatory Referral For Screening Colonoscopy    7. Immunization due  -     Tdap Vaccine Greater Than or Equal To 6yo IM    Will discontinue HCTZ in blood pressure medication and change to lisinopril 20.  Patient to monitor blood pressure and follow-up with elevated readings and/or peripheral edema.    Follow Up   Return in about 6 months (around 11/11/2022) for Refills and fasting labs.  Patient was given instructions and counseling regarding his condition or for health maintenance advice. Please see specific information pulled into the AVS if appropriate.

## 2022-05-14 LAB
TESTOST FREE SERPL-MCNC: 7.1 PG/ML (ref 7.2–24)
TESTOST SERPL-MCNC: 497 NG/DL (ref 264–916)

## 2022-05-25 ENCOUNTER — CLINICAL SUPPORT (OUTPATIENT)
Dept: GASTROENTEROLOGY | Facility: CLINIC | Age: 52
End: 2022-05-25

## 2022-05-25 ENCOUNTER — PREP FOR SURGERY (OUTPATIENT)
Dept: OTHER | Facility: HOSPITAL | Age: 52
End: 2022-05-25

## 2022-05-25 DIAGNOSIS — Z12.11 SCREENING FOR COLON CANCER: Primary | ICD-10-CM

## 2022-05-25 RX ORDER — SODIUM, POTASSIUM,MAG SULFATES 17.5-3.13G
1 SOLUTION, RECONSTITUTED, ORAL ORAL EVERY 12 HOURS
Qty: 354 ML | Refills: 0 | Status: ON HOLD | OUTPATIENT
Start: 2022-05-25 | End: 2022-10-14

## 2022-05-25 NOTE — PROGRESS NOTES
Danilo Crane  REASON FOR CALL screening for colon cancer   SENT IN PREP suprep  Past Medical History:   Diagnosis Date   • GERD (gastroesophageal reflux disease)    • Hyperlipidemia    • Hypertension      No Known Allergies  No past surgical history on file.  Social History     Socioeconomic History   • Marital status:    Tobacco Use   • Smoking status: Never Smoker   • Smokeless tobacco: Current User     Types: Chew   Vaping Use   • Vaping Use: Never used   Substance and Sexual Activity   • Alcohol use: Yes     Comment: occasionally   • Drug use: Never   • Sexual activity: Defer     Family History   Problem Relation Age of Onset   • Arthritis Mother    • No Known Problems Father        Current Outpatient Medications:   •  esomeprazole (nexIUM) 20 MG capsule, Take 20 mg by mouth Daily., Disp: , Rfl:   •  lisinopril (PRINIVIL,ZESTRIL) 20 MG tablet, Take 1 tablet by mouth Daily., Disp: 90 tablet, Rfl: 1  •  rosuvastatin (CRESTOR) 5 MG tablet, Take 1 tablet by mouth Daily., Disp: 90 tablet, Rfl: 1

## 2022-09-29 ENCOUNTER — TELEPHONE (OUTPATIENT)
Dept: GASTROENTEROLOGY | Facility: CLINIC | Age: 52
End: 2022-09-29

## 2022-10-14 ENCOUNTER — HOSPITAL ENCOUNTER (OUTPATIENT)
Facility: HOSPITAL | Age: 52
Setting detail: HOSPITAL OUTPATIENT SURGERY
Discharge: HOME | End: 2022-10-14
Attending: INTERNAL MEDICINE | Admitting: INTERNAL MEDICINE

## 2022-10-14 ENCOUNTER — ANESTHESIA EVENT (OUTPATIENT)
Dept: GASTROENTEROLOGY | Facility: HOSPITAL | Age: 52
End: 2022-10-14

## 2022-10-14 ENCOUNTER — ANESTHESIA (OUTPATIENT)
Dept: GASTROENTEROLOGY | Facility: HOSPITAL | Age: 52
End: 2022-10-14

## 2022-10-14 VITALS
BODY MASS INDEX: 25.05 KG/M2 | TEMPERATURE: 97.2 F | DIASTOLIC BLOOD PRESSURE: 93 MMHG | HEIGHT: 67 IN | OXYGEN SATURATION: 99 % | RESPIRATION RATE: 11 BRPM | HEART RATE: 61 BPM | WEIGHT: 159.61 LBS | SYSTOLIC BLOOD PRESSURE: 124 MMHG

## 2022-10-14 DIAGNOSIS — Z12.11 SCREENING FOR COLON CANCER: ICD-10-CM

## 2022-10-14 PROCEDURE — 45380 COLONOSCOPY AND BIOPSY: CPT | Performed by: INTERNAL MEDICINE

## 2022-10-14 PROCEDURE — 88305 TISSUE EXAM BY PATHOLOGIST: CPT | Performed by: INTERNAL MEDICINE

## 2022-10-14 PROCEDURE — 25010000002 PROPOFOL 10 MG/ML EMULSION: Performed by: NURSE ANESTHETIST, CERTIFIED REGISTERED

## 2022-10-14 RX ORDER — HYDROCORTISONE 25 MG/G
CREAM TOPICAL 2 TIMES DAILY
Qty: 28 G | Refills: 0 | Status: SHIPPED | OUTPATIENT
Start: 2022-10-14 | End: 2022-10-28

## 2022-10-14 RX ORDER — LIDOCAINE HYDROCHLORIDE 20 MG/ML
INJECTION, SOLUTION EPIDURAL; INFILTRATION; INTRACAUDAL; PERINEURAL AS NEEDED
Status: DISCONTINUED | OUTPATIENT
Start: 2022-10-14 | End: 2022-10-14 | Stop reason: SURG

## 2022-10-14 RX ORDER — PROPOFOL 10 MG/ML
VIAL (ML) INTRAVENOUS AS NEEDED
Status: DISCONTINUED | OUTPATIENT
Start: 2022-10-14 | End: 2022-10-14 | Stop reason: SURG

## 2022-10-14 RX ORDER — SODIUM CHLORIDE, SODIUM LACTATE, POTASSIUM CHLORIDE, CALCIUM CHLORIDE 600; 310; 30; 20 MG/100ML; MG/100ML; MG/100ML; MG/100ML
30 INJECTION, SOLUTION INTRAVENOUS CONTINUOUS
Status: DISCONTINUED | OUTPATIENT
Start: 2022-10-14 | End: 2022-10-14 | Stop reason: HOSPADM

## 2022-10-14 RX ADMIN — LIDOCAINE HYDROCHLORIDE 100 MG: 20 INJECTION, SOLUTION EPIDURAL; INFILTRATION; INTRACAUDAL; PERINEURAL at 11:21

## 2022-10-14 RX ADMIN — PROPOFOL 200 MCG/KG/MIN: 10 INJECTION, EMULSION INTRAVENOUS at 11:21

## 2022-10-14 RX ADMIN — PROPOFOL 100 MG: 10 INJECTION, EMULSION INTRAVENOUS at 11:21

## 2022-10-14 RX ADMIN — SODIUM CHLORIDE, POTASSIUM CHLORIDE, SODIUM LACTATE AND CALCIUM CHLORIDE 30 ML/HR: 600; 310; 30; 20 INJECTION, SOLUTION INTRAVENOUS at 10:41

## 2022-10-14 NOTE — ANESTHESIA PREPROCEDURE EVALUATION
Anesthesia Evaluation     Patient summary reviewed and Nursing notes reviewed                Airway   Mallampati: I  TM distance: >3 FB  Neck ROM: full  No difficulty expected  Dental      Pulmonary - negative pulmonary ROS and normal exam    breath sounds clear to auscultation  Cardiovascular - normal exam    Rhythm: regular  Rate: normal    (+) hypertension, hyperlipidemia,       Neuro/Psych- negative ROS  GI/Hepatic/Renal/Endo    (+)  GERD,      Musculoskeletal (-) negative ROS    Abdominal    Substance History - negative use     OB/GYN negative ob/gyn ROS         Other                        Anesthesia Plan    ASA 2     general     intravenous induction     Anesthetic plan, risks, benefits, and alternatives have been provided, discussed and informed consent has been obtained with: patient.        CODE STATUS:

## 2022-10-14 NOTE — ANESTHESIA POSTPROCEDURE EVALUATION
Patient: Danilo Crane    Procedure Summary     Date: 10/14/22 Room / Location: [facility] ENDOSCOPY 1 / [facility] ENDOSCOPY    Anesthesia Start: 1119 Anesthesia Stop: 1140    Procedure: COLONOSCOPY with biopsy Diagnosis:       Screening for colon cancer      (Screening for colon cancer [Z12.11])    Surgeons: Faby Callahan MD Provider: Russell Loving MD    Anesthesia Type: general ASA Status: 2          Anesthesia Type: general    Vitals  Vitals Value Taken Time   /83 10/14/22 1150   Temp 36.1 °C (97 °F) 10/14/22 1140   Pulse 69 10/14/22 1151   Resp 17 10/14/22 1150   SpO2 96 % 10/14/22 1151   Vitals shown include unvalidated device data.        Post Anesthesia Care and Evaluation    Patient location during evaluation: bedside  Patient participation: complete - patient participated  Level of consciousness: awake  Pain management: adequate    Airway patency: patent  Anesthetic complications: No anesthetic complications  PONV Status: none  Cardiovascular status: acceptable  Respiratory status: acceptable  Hydration status: acceptable    Comments: An Anesthesiologist personally participated in the most demanding procedures (including induction and emergence if applicable) in the anesthesia plan, monitored the course of anesthesia administration at frequent intervals and remained physically present and available for immediate diagnosis and treatment of emergencies.

## 2022-10-14 NOTE — H&P
"Pre Procedure History & Physical    Chief Complaint:   Screening colonoscopy    Subjective     HPI:   52 yo M here for screening colonoscopy.    Past Medical History:   Past Medical History:   Diagnosis Date   • GERD (gastroesophageal reflux disease)    • Hyperlipidemia    • Hypertension        Past Surgical History:  Past Surgical History:   Procedure Laterality Date   • ESOPHAGOSCOPY / EGD     • WISDOM TOOTH EXTRACTION         Family History:  Family History   Problem Relation Age of Onset   • Arthritis Mother    • No Known Problems Father    • Malig Hyperthermia Neg Hx        Social History:   reports that he has never smoked. His smokeless tobacco use includes chew. He reports current alcohol use. He reports that he does not use drugs.    Medications:   Medications Prior to Admission   Medication Sig Dispense Refill Last Dose   • esomeprazole (nexIUM) 20 MG capsule Take 20 mg by mouth Daily.      • lisinopril (PRINIVIL,ZESTRIL) 20 MG tablet Take 1 tablet by mouth Daily. 90 tablet 1    • rosuvastatin (CRESTOR) 5 MG tablet Take 1 tablet by mouth Daily. 90 tablet 1    • sodium-potassium-magnesium sulfates (Suprep Bowel Prep Kit) 17.5-3.13-1.6 GM/177ML solution oral solution Take 1 bottle by mouth Every 12 (Twelve) Hours. 354 mL 0        Allergies:  Patient has no known allergies.    ROS:    Pertinent items are noted in HPI     Objective     Blood pressure 124/93, pulse 76, temperature 98.2 °F (36.8 °C), temperature source Temporal, resp. rate 20, height 170.2 cm (67\"), weight 72.4 kg (159 lb 9.8 oz), SpO2 98 %.    Physical Exam   Constitutional: Pt is oriented to person, place, and time and well-developed, well-nourished, and in no distress.   Mouth/Throat: Oropharynx is clear and moist.   Neck: Normal range of motion.   Cardiovascular: Normal rate, regular rhythm and normal heart sounds.    Pulmonary/Chest: Effort normal and breath sounds normal.   Abdominal: Soft. Nontender  Skin: Skin is warm and dry. "   Psychiatric: Mood, memory, affect and judgment normal.     Assessment & Plan     Diagnosis:  Screening colonoscopy    Anticipated Surgical Procedure:  Colonoscopy    The risks, benefits, and alternatives of this procedure have been discussed with the patient or the responsible party- the patient understands and agrees to proceed.

## 2022-10-17 LAB
CYTO UR: NORMAL
LAB AP CASE REPORT: NORMAL
LAB AP CLINICAL INFORMATION: NORMAL
PATH REPORT.FINAL DX SPEC: NORMAL
PATH REPORT.GROSS SPEC: NORMAL

## 2022-11-02 DIAGNOSIS — E78.5 HYPERLIPIDEMIA, UNSPECIFIED HYPERLIPIDEMIA TYPE: ICD-10-CM

## 2022-11-02 DIAGNOSIS — I10 ESSENTIAL HYPERTENSION: ICD-10-CM

## 2022-11-02 RX ORDER — ROSUVASTATIN CALCIUM 5 MG/1
5 TABLET, COATED ORAL DAILY
Qty: 90 TABLET | Refills: 1 | OUTPATIENT
Start: 2022-11-02

## 2022-11-02 RX ORDER — LISINOPRIL 20 MG/1
20 TABLET ORAL DAILY
Qty: 90 TABLET | Refills: 1 | OUTPATIENT
Start: 2022-11-02

## 2022-11-10 ENCOUNTER — OFFICE VISIT (OUTPATIENT)
Dept: FAMILY MEDICINE CLINIC | Facility: CLINIC | Age: 52
End: 2022-11-10

## 2022-11-10 VITALS
BODY MASS INDEX: 26.06 KG/M2 | TEMPERATURE: 97.7 F | HEART RATE: 78 BPM | WEIGHT: 166 LBS | OXYGEN SATURATION: 98 % | SYSTOLIC BLOOD PRESSURE: 130 MMHG | HEIGHT: 67 IN | DIASTOLIC BLOOD PRESSURE: 82 MMHG

## 2022-11-10 DIAGNOSIS — Z12.5 SCREENING FOR MALIGNANT NEOPLASM OF PROSTATE: ICD-10-CM

## 2022-11-10 DIAGNOSIS — Z23 NEED FOR INFLUENZA VACCINATION: ICD-10-CM

## 2022-11-10 DIAGNOSIS — I10 ESSENTIAL HYPERTENSION: Primary | ICD-10-CM

## 2022-11-10 DIAGNOSIS — E78.5 HYPERLIPIDEMIA, UNSPECIFIED HYPERLIPIDEMIA TYPE: ICD-10-CM

## 2022-11-10 DIAGNOSIS — R73.02 IMPAIRED GLUCOSE TOLERANCE: ICD-10-CM

## 2022-11-10 LAB
ALBUMIN SERPL-MCNC: 4.4 G/DL (ref 3.5–5.2)
ALBUMIN/GLOB SERPL: 1.8 G/DL
ALP SERPL-CCNC: 54 U/L (ref 39–117)
ALT SERPL W P-5'-P-CCNC: 31 U/L (ref 1–41)
ANION GAP SERPL CALCULATED.3IONS-SCNC: 11.3 MMOL/L (ref 5–15)
AST SERPL-CCNC: 33 U/L (ref 1–40)
BASOPHILS # BLD AUTO: 0.04 10*3/MM3 (ref 0–0.2)
BASOPHILS NFR BLD AUTO: 0.6 % (ref 0–1.5)
BILIRUB SERPL-MCNC: 0.4 MG/DL (ref 0–1.2)
BUN SERPL-MCNC: 11 MG/DL (ref 6–20)
BUN/CREAT SERPL: 11.1 (ref 7–25)
CALCIUM SPEC-SCNC: 9.2 MG/DL (ref 8.6–10.5)
CHLORIDE SERPL-SCNC: 102 MMOL/L (ref 98–107)
CHOLEST SERPL-MCNC: 127 MG/DL (ref 0–200)
CO2 SERPL-SCNC: 25.7 MMOL/L (ref 22–29)
CREAT SERPL-MCNC: 0.99 MG/DL (ref 0.76–1.27)
DEPRECATED RDW RBC AUTO: 40.1 FL (ref 37–54)
EGFRCR SERPLBLD CKD-EPI 2021: 92.8 ML/MIN/1.73
EOSINOPHIL # BLD AUTO: 0.19 10*3/MM3 (ref 0–0.4)
EOSINOPHIL NFR BLD AUTO: 2.6 % (ref 0.3–6.2)
ERYTHROCYTE [DISTWIDTH] IN BLOOD BY AUTOMATED COUNT: 12.6 % (ref 12.3–15.4)
GLOBULIN UR ELPH-MCNC: 2.4 GM/DL
GLUCOSE SERPL-MCNC: 83 MG/DL (ref 65–99)
HBA1C MFR BLD: 6.1 % (ref 4.8–5.6)
HCT VFR BLD AUTO: 46.6 % (ref 37.5–51)
HDLC SERPL-MCNC: 39 MG/DL (ref 40–60)
HGB BLD-MCNC: 15.9 G/DL (ref 13–17.7)
IMM GRANULOCYTES # BLD AUTO: 0.01 10*3/MM3 (ref 0–0.05)
IMM GRANULOCYTES NFR BLD AUTO: 0.1 % (ref 0–0.5)
LDLC SERPL CALC-MCNC: 74 MG/DL (ref 0–100)
LDLC/HDLC SERPL: 1.91 {RATIO}
LYMPHOCYTES # BLD AUTO: 1.61 10*3/MM3 (ref 0.7–3.1)
LYMPHOCYTES NFR BLD AUTO: 22.4 % (ref 19.6–45.3)
MCH RBC QN AUTO: 29.7 PG (ref 26.6–33)
MCHC RBC AUTO-ENTMCNC: 34.1 G/DL (ref 31.5–35.7)
MCV RBC AUTO: 87.1 FL (ref 79–97)
MONOCYTES # BLD AUTO: 0.75 10*3/MM3 (ref 0.1–0.9)
MONOCYTES NFR BLD AUTO: 10.4 % (ref 5–12)
NEUTROPHILS NFR BLD AUTO: 4.58 10*3/MM3 (ref 1.7–7)
NEUTROPHILS NFR BLD AUTO: 63.9 % (ref 42.7–76)
NRBC BLD AUTO-RTO: 0 /100 WBC (ref 0–0.2)
PLATELET # BLD AUTO: 246 10*3/MM3 (ref 140–450)
PMV BLD AUTO: 10.8 FL (ref 6–12)
POTASSIUM SERPL-SCNC: 5 MMOL/L (ref 3.5–5.2)
PROT SERPL-MCNC: 6.8 G/DL (ref 6–8.5)
PSA SERPL-MCNC: 1.86 NG/ML (ref 0–4)
RBC # BLD AUTO: 5.35 10*6/MM3 (ref 4.14–5.8)
SODIUM SERPL-SCNC: 139 MMOL/L (ref 136–145)
TRIGL SERPL-MCNC: 68 MG/DL (ref 0–150)
TSH SERPL DL<=0.05 MIU/L-ACNC: 0.97 UIU/ML (ref 0.27–4.2)
VLDLC SERPL-MCNC: 14 MG/DL (ref 5–40)
WBC NRBC COR # BLD: 7.18 10*3/MM3 (ref 3.4–10.8)

## 2022-11-10 PROCEDURE — 99214 OFFICE O/P EST MOD 30 MIN: CPT | Performed by: NURSE PRACTITIONER

## 2022-11-10 PROCEDURE — 90686 IIV4 VACC NO PRSV 0.5 ML IM: CPT | Performed by: NURSE PRACTITIONER

## 2022-11-10 PROCEDURE — 83036 HEMOGLOBIN GLYCOSYLATED A1C: CPT | Performed by: NURSE PRACTITIONER

## 2022-11-10 PROCEDURE — G0103 PSA SCREENING: HCPCS | Performed by: NURSE PRACTITIONER

## 2022-11-10 PROCEDURE — 80050 GENERAL HEALTH PANEL: CPT | Performed by: NURSE PRACTITIONER

## 2022-11-10 PROCEDURE — 80061 LIPID PANEL: CPT | Performed by: NURSE PRACTITIONER

## 2022-11-10 PROCEDURE — 90471 IMMUNIZATION ADMIN: CPT | Performed by: NURSE PRACTITIONER

## 2022-11-10 RX ORDER — ROSUVASTATIN CALCIUM 5 MG/1
5 TABLET, COATED ORAL DAILY
Qty: 90 TABLET | Refills: 1 | Status: SHIPPED | OUTPATIENT
Start: 2022-11-10

## 2022-11-10 RX ORDER — LISINOPRIL 20 MG/1
20 TABLET ORAL DAILY
Qty: 90 TABLET | Refills: 1 | Status: SHIPPED | OUTPATIENT
Start: 2022-11-10

## 2022-11-10 NOTE — PROGRESS NOTES
Venipuncture Blood Specimen Collection  Venipuncture performed in left arm by Carol Corrales with good hemostasis. Patient tolerated the procedure well without complications.   11/10/22   Carol Corrales

## 2022-11-10 NOTE — PROGRESS NOTES
Answers for HPI/ROS submitted by the patient on 11/9/2022  What is the primary reason for your visit?: Other  Please describe your symptoms.: regular check up  Have you had these symptoms before?: No  How long have you been having these symptoms?: Greater than 2 weeks    Chief Complaint  Hypertension and Hyperlipidemia (Refills and labs )    Subjective        Past Medical History:   Diagnosis Date   • GERD (gastroesophageal reflux disease)    • Hyperlipidemia    • Hypertension      Social History     Tobacco Use   • Smoking status: Never   • Smokeless tobacco: Current     Types: Chew   • Tobacco comments:     use chewing tobacco   Vaping Use   • Vaping Use: Never used   Substance Use Topics   • Alcohol use: Yes     Alcohol/week: 1.0 standard drink     Types: 1 Cans of beer per week     Comment: occasionally   • Drug use: Never      Current Outpatient Medications on File Prior to Visit   Medication Sig   • esomeprazole (nexIUM) 20 MG capsule Take 20 mg by mouth Daily.   • [DISCONTINUED] lisinopril (PRINIVIL,ZESTRIL) 20 MG tablet Take 1 tablet by mouth Daily.   • [DISCONTINUED] rosuvastatin (CRESTOR) 5 MG tablet Take 1 tablet by mouth Daily.     No current facility-administered medications on file prior to visit.      No Known Allergies   Health Maintenance Due   Topic Date Due   • ANNUAL PHYSICAL  Never done   • ZOSTER VACCINE (1 of 2) Never done   • COVID-19 Vaccine (3 - Booster for Moderna series) 04/20/2022      Danilo Crane presents to [facility]  History of Present Illness  Chronic conditions.     HTN: stable in office; does not monitor at home due to was having consistently normal readings. Pt does not follow any dietary restrictions. 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     Prediabetes: pt states he does not follow any restrictions but according to home scale about 5lb weight loss.     Hyperlipidemia: denies muscle aches or cramps.     GERD: Patient  "states that he is purchasing his Nexium over-the-counter and symptoms are well controlled, he has been using the dollar store brand with well-controlled symptoms.      Objective   Vital Signs:   /82   Pulse 78   Temp 97.7 °F (36.5 °C)   Ht 170.2 cm (67\")   Wt 75.3 kg (166 lb)   SpO2 98%   BMI 26.00 kg/m²     Review of Systems   Respiratory: Negative for shortness of breath.    Cardiovascular: Negative for chest pain, palpitations and leg swelling.      Physical Exam  Vitals reviewed.   Constitutional:       General: He is not in acute distress.     Appearance: Normal appearance. He is well-developed.   Eyes:      Conjunctiva/sclera: Conjunctivae normal.      Pupils: Pupils are equal, round, and reactive to light.   Cardiovascular:      Rate and Rhythm: Normal rate and regular rhythm.      Heart sounds: Normal heart sounds.   Pulmonary:      Effort: Pulmonary effort is normal.      Breath sounds: Normal breath sounds.   Musculoskeletal:      Cervical back: Neck supple.      Right lower leg: No edema.      Left lower leg: No edema.   Skin:     General: Skin is warm and dry.   Neurological:      Mental Status: He is alert and oriented to person, place, and time.   Psychiatric:         Mood and Affect: Mood and affect normal.         Behavior: Behavior normal.         Thought Content: Thought content normal.         Judgment: Judgment normal.        Result Review :   The following data was reviewed by: NAHID Baron on 11/10/2022:  Common labs    Common Labs 5/11/22 5/11/22 5/11/22 5/11/22    [id number]   Glucose  111 (A)     BUN  16     Creatinine  1.13     Sodium  140     Potassium  4.6     Chloride  102     Calcium  9.7     Albumin  4.60     Total Bilirubin  0.4     Alkaline Phosphatase  52     AST (SGOT)  16     ALT (SGPT)  24     WBC 6.69      Hemoglobin 16.6      Hematocrit 48.8      Platelets 257      Total Cholesterol   158    Triglycerides   100    HDL Cholesterol   39 (A)    LDL " Cholesterol    100    Hemoglobin A1C    6.30 (A)   (A) Abnormal value                          Assessment and Plan    Diagnoses and all orders for this visit:    1. Essential hypertension (Primary)  -     lisinopril (PRINIVIL,ZESTRIL) 20 MG tablet; Take 1 tablet by mouth Daily.  Dispense: 90 tablet; Refill: 1  -     CBC & Differential  -     Comprehensive Metabolic Panel  -     TSH Rfx On Abnormal To Free T4    2. Hyperlipidemia, unspecified hyperlipidemia type  -     rosuvastatin (CRESTOR) 5 MG tablet; Take 1 tablet by mouth Daily.  Dispense: 90 tablet; Refill: 1  -     Lipid Panel    3. Need for influenza vaccination  -     FluLaval/Fluarix/Fluzone >6 Months    4. Impaired glucose tolerance  -     Hemoglobin A1c    5. Screening for malignant neoplasm of prostate  -     PSA Screen        Follow Up   Return in about 6 months (around 5/10/2023) for Refills and fasting labs.  Patient was given instructions and counseling regarding his condition or for health maintenance advice. Please see specific information pulled into the AVS if appropriate.

## 2023-05-11 DIAGNOSIS — E78.5 HYPERLIPIDEMIA, UNSPECIFIED HYPERLIPIDEMIA TYPE: ICD-10-CM

## 2023-05-11 DIAGNOSIS — I10 ESSENTIAL HYPERTENSION: ICD-10-CM

## 2023-05-11 RX ORDER — LISINOPRIL 20 MG/1
20 TABLET ORAL DAILY
Qty: 90 TABLET | OUTPATIENT
Start: 2023-05-11

## 2023-05-11 RX ORDER — LISINOPRIL 20 MG/1
20 TABLET ORAL DAILY
Qty: 30 TABLET | Refills: 0 | Status: SHIPPED | OUTPATIENT
Start: 2023-05-11

## 2023-05-11 RX ORDER — ROSUVASTATIN CALCIUM 5 MG/1
5 TABLET, COATED ORAL DAILY
Qty: 30 TABLET | Refills: 0 | Status: SHIPPED | OUTPATIENT
Start: 2023-05-11

## 2023-05-11 RX ORDER — ROSUVASTATIN CALCIUM 5 MG/1
5 TABLET, COATED ORAL DAILY
Qty: 90 TABLET | OUTPATIENT
Start: 2023-05-11

## 2023-05-24 ENCOUNTER — OFFICE VISIT (OUTPATIENT)
Dept: FAMILY MEDICINE CLINIC | Facility: CLINIC | Age: 53
End: 2023-05-24
Payer: COMMERCIAL

## 2023-05-24 VITALS
HEART RATE: 74 BPM | HEIGHT: 67 IN | TEMPERATURE: 97.5 F | BODY MASS INDEX: 26.53 KG/M2 | SYSTOLIC BLOOD PRESSURE: 98 MMHG | OXYGEN SATURATION: 99 % | DIASTOLIC BLOOD PRESSURE: 62 MMHG | WEIGHT: 169 LBS

## 2023-05-24 DIAGNOSIS — K21.9 GASTROESOPHAGEAL REFLUX DISEASE, UNSPECIFIED WHETHER ESOPHAGITIS PRESENT: ICD-10-CM

## 2023-05-24 DIAGNOSIS — I10 ESSENTIAL HYPERTENSION: ICD-10-CM

## 2023-05-24 DIAGNOSIS — R73.02 IMPAIRED GLUCOSE TOLERANCE: ICD-10-CM

## 2023-05-24 DIAGNOSIS — E78.5 HYPERLIPIDEMIA, UNSPECIFIED HYPERLIPIDEMIA TYPE: Primary | ICD-10-CM

## 2023-05-24 LAB
ALBUMIN SERPL-MCNC: 4.6 G/DL (ref 3.5–5.2)
ALBUMIN/GLOB SERPL: 2.1 G/DL
ALP SERPL-CCNC: 55 U/L (ref 39–117)
ALT SERPL W P-5'-P-CCNC: 35 U/L (ref 1–41)
ANION GAP SERPL CALCULATED.3IONS-SCNC: 8.9 MMOL/L (ref 5–15)
AST SERPL-CCNC: 17 U/L (ref 1–40)
BASOPHILS # BLD AUTO: 0.05 10*3/MM3 (ref 0–0.2)
BASOPHILS NFR BLD AUTO: 0.8 % (ref 0–1.5)
BILIRUB SERPL-MCNC: 0.5 MG/DL (ref 0–1.2)
BILIRUB UR QL STRIP: NEGATIVE
BUN SERPL-MCNC: 10 MG/DL (ref 6–20)
BUN/CREAT SERPL: 9.4 (ref 7–25)
CALCIUM SPEC-SCNC: 9.7 MG/DL (ref 8.6–10.5)
CHLORIDE SERPL-SCNC: 103 MMOL/L (ref 98–107)
CHOLEST SERPL-MCNC: 144 MG/DL (ref 0–200)
CLARITY UR: CLEAR
CO2 SERPL-SCNC: 27.1 MMOL/L (ref 22–29)
COLOR UR: YELLOW
CREAT SERPL-MCNC: 1.06 MG/DL (ref 0.76–1.27)
DEPRECATED RDW RBC AUTO: 40.4 FL (ref 37–54)
EGFRCR SERPLBLD CKD-EPI 2021: 85 ML/MIN/1.73
EOSINOPHIL # BLD AUTO: 0.27 10*3/MM3 (ref 0–0.4)
EOSINOPHIL NFR BLD AUTO: 4.1 % (ref 0.3–6.2)
ERYTHROCYTE [DISTWIDTH] IN BLOOD BY AUTOMATED COUNT: 12.8 % (ref 12.3–15.4)
GLOBULIN UR ELPH-MCNC: 2.2 GM/DL
GLUCOSE SERPL-MCNC: 99 MG/DL (ref 65–99)
GLUCOSE UR STRIP-MCNC: NEGATIVE MG/DL
HBA1C MFR BLD: 6.1 % (ref 4.8–5.6)
HCT VFR BLD AUTO: 47.5 % (ref 37.5–51)
HDLC SERPL-MCNC: 35 MG/DL (ref 40–60)
HGB BLD-MCNC: 16.3 G/DL (ref 13–17.7)
HGB UR QL STRIP.AUTO: NEGATIVE
IMM GRANULOCYTES # BLD AUTO: 0.01 10*3/MM3 (ref 0–0.05)
IMM GRANULOCYTES NFR BLD AUTO: 0.2 % (ref 0–0.5)
KETONES UR QL STRIP: NEGATIVE
LDLC SERPL CALC-MCNC: 88 MG/DL (ref 0–100)
LDLC/HDLC SERPL: 2.46 {RATIO}
LEUKOCYTE ESTERASE UR QL STRIP.AUTO: NEGATIVE
LYMPHOCYTES # BLD AUTO: 1.77 10*3/MM3 (ref 0.7–3.1)
LYMPHOCYTES NFR BLD AUTO: 27 % (ref 19.6–45.3)
MCH RBC QN AUTO: 30 PG (ref 26.6–33)
MCHC RBC AUTO-ENTMCNC: 34.3 G/DL (ref 31.5–35.7)
MCV RBC AUTO: 87.3 FL (ref 79–97)
MONOCYTES # BLD AUTO: 0.62 10*3/MM3 (ref 0.1–0.9)
MONOCYTES NFR BLD AUTO: 9.5 % (ref 5–12)
NEUTROPHILS NFR BLD AUTO: 3.84 10*3/MM3 (ref 1.7–7)
NEUTROPHILS NFR BLD AUTO: 58.4 % (ref 42.7–76)
NITRITE UR QL STRIP: NEGATIVE
NRBC BLD AUTO-RTO: 0 /100 WBC (ref 0–0.2)
PH UR STRIP.AUTO: 7 [PH] (ref 5–8)
PLATELET # BLD AUTO: 280 10*3/MM3 (ref 140–450)
PMV BLD AUTO: 10.7 FL (ref 6–12)
POTASSIUM SERPL-SCNC: 4.4 MMOL/L (ref 3.5–5.2)
PROT SERPL-MCNC: 6.8 G/DL (ref 6–8.5)
PROT UR QL STRIP: NEGATIVE
RBC # BLD AUTO: 5.44 10*6/MM3 (ref 4.14–5.8)
SODIUM SERPL-SCNC: 139 MMOL/L (ref 136–145)
SP GR UR STRIP: <1.005 (ref 1–1.03)
TRIGL SERPL-MCNC: 114 MG/DL (ref 0–150)
UROBILINOGEN UR QL STRIP: ABNORMAL
VLDLC SERPL-MCNC: 21 MG/DL (ref 5–40)
WBC NRBC COR # BLD: 6.56 10*3/MM3 (ref 3.4–10.8)

## 2023-05-24 PROCEDURE — 80053 COMPREHEN METABOLIC PANEL: CPT | Performed by: NURSE PRACTITIONER

## 2023-05-24 PROCEDURE — 80061 LIPID PANEL: CPT | Performed by: NURSE PRACTITIONER

## 2023-05-24 PROCEDURE — 83036 HEMOGLOBIN GLYCOSYLATED A1C: CPT | Performed by: NURSE PRACTITIONER

## 2023-05-24 PROCEDURE — 85025 COMPLETE CBC W/AUTO DIFF WBC: CPT | Performed by: NURSE PRACTITIONER

## 2023-05-24 PROCEDURE — 81003 URINALYSIS AUTO W/O SCOPE: CPT | Performed by: NURSE PRACTITIONER

## 2023-05-24 RX ORDER — LISINOPRIL 20 MG/1
20 TABLET ORAL DAILY
Qty: 90 TABLET | Refills: 1 | Status: SHIPPED | OUTPATIENT
Start: 2023-05-24

## 2023-05-24 RX ORDER — ROSUVASTATIN CALCIUM 5 MG/1
5 TABLET, COATED ORAL DAILY
Qty: 90 TABLET | Refills: 1 | Status: SHIPPED | OUTPATIENT
Start: 2023-05-24

## 2023-05-24 NOTE — PROGRESS NOTES
..  Venipuncture Blood Specimen Collection  Venipuncture performed in left arm by Genoveva Morocho with good hemostasis. Patient tolerated the procedure well without complications.   05/24/23   Genoveva Morocho

## 2023-05-24 NOTE — PROGRESS NOTES
"Chief Complaint  Hyperlipidemia (Refills and labs, patient is fasting) and Hypertension    PHQ-2 Total Score: 0    Subjective        Past Medical History:   Diagnosis Date   • GERD (gastroesophageal reflux disease)    • Hyperlipidemia    • Hypertension      Social History     Tobacco Use   • Smoking status: Never     Passive exposure: Past   • Smokeless tobacco: Current     Types: Chew   • Tobacco comments:     use chewing tobacco   Vaping Use   • Vaping Use: Never used   Substance Use Topics   • Alcohol use: Yes     Alcohol/week: 1.0 standard drink     Types: 1 Cans of beer per week     Comment: occasionally   • Drug use: Never      Current Outpatient Medications on File Prior to Visit   Medication Sig   • esomeprazole (nexIUM) 20 MG capsule Take 1 capsule by mouth Daily.   • [DISCONTINUED] lisinopril (PRINIVIL,ZESTRIL) 20 MG tablet TAKE 1 TABLET BY MOUTH DAILY   • [DISCONTINUED] rosuvastatin (CRESTOR) 5 MG tablet TAKE 1 TABLET BY MOUTH DAILY     No current facility-administered medications on file prior to visit.      No Known Allergies   Health Maintenance Due   Topic Date Due   • ANNUAL PHYSICAL  Never done   • ZOSTER VACCINE (1 of 2) Never done   • COVID-19 Vaccine (3 - Booster for Moderna series) 04/20/2022      Danilo Crane presents to [facility]  History of Present Illness  Chronic conditions    HTN: slightly hypertensive in office. He does not monitor regularly at home.     Hyperlipidemia: denies muscle aches or cramps since decreasing dose. Pt eats out frequently.     GERD: pt takes OTC Nexium (generic) to control symptoms. Pizza and spicy foods will trigger symptoms.       Objective   Vital Signs:   BP 98/62 (BP Location: Left arm, Patient Position: Sitting, Cuff Size: Adult)   Pulse 74   Temp 97.5 °F (36.4 °C)   Ht 170.2 cm (67\")   Wt 76.7 kg (169 lb)   SpO2 99%   BMI 26.47 kg/m²     Review of Systems   Respiratory: Negative for cough and shortness of " breath.    Cardiovascular: Negative for chest pain and leg swelling.   Gastrointestinal: Negative for constipation, diarrhea, nausea and vomiting.   Neurological: Negative for headache.      Physical Exam  Vitals reviewed.   Constitutional:       General: He is not in acute distress.     Appearance: Normal appearance. He is well-developed.   Eyes:      Conjunctiva/sclera: Conjunctivae normal.      Pupils: Pupils are equal, round, and reactive to light.   Cardiovascular:      Rate and Rhythm: Normal rate and regular rhythm.      Heart sounds: Normal heart sounds.   Pulmonary:      Effort: Pulmonary effort is normal.      Breath sounds: Normal breath sounds.   Musculoskeletal:      Cervical back: Neck supple.      Right lower leg: No edema.      Left lower leg: No edema.   Skin:     General: Skin is warm and dry.   Neurological:      Mental Status: He is alert and oriented to person, place, and time.   Psychiatric:         Mood and Affect: Mood and affect normal.         Behavior: Behavior normal.         Thought Content: Thought content normal.         Judgment: Judgment normal.        Result Review :   The following data was reviewed by: NAHID Baron on 05/24/2023:  Common labs        11/10/2022    09:34   Common Labs   Glucose 83     BUN 11     Creatinine 0.99     Sodium 139     Potassium 5.0     Chloride 102     Calcium 9.2     Albumin 4.40     Total Bilirubin 0.4     Alkaline Phosphatase 54     AST (SGOT) 33     ALT (SGPT) 31     WBC 7.18     Hemoglobin 15.9     Hematocrit 46.6     Platelets 246     Total Cholesterol 127     Triglycerides 68     HDL Cholesterol 39     LDL Cholesterol  74     Hemoglobin A1C 6.10     PSA 1.860       TSH        11/10/2022    09:34   TSH   TSH 0.974       Data reviewed: GI studies colonoscopy          Assessment and Plan    Diagnoses and all orders for this visit:    1. Hyperlipidemia, unspecified hyperlipidemia type (Primary)  -     Lipid Panel  -     rosuvastatin  (CRESTOR) 5 MG tablet; Take 1 tablet by mouth Daily.  Dispense: 90 tablet; Refill: 1    2. Essential hypertension  -     CBC & Differential  -     Comprehensive Metabolic Panel  -     Urinalysis With Culture If Indicated - Urine, Clean Catch  -     lisinopril (PRINIVIL,ZESTRIL) 20 MG tablet; Take 1 tablet by mouth Daily.  Dispense: 90 tablet; Refill: 1    3. Gastroesophageal reflux disease, unspecified whether esophagitis present    4. Impaired glucose tolerance  -     Hemoglobin A1c    Recommend patient to make healthier doses and eating out.  Repeat blood pressure check.    Follow Up   Return in about 6 months (around 11/24/2023) for Refills and fasting labs.  Patient was given instructions and counseling regarding his condition or for health maintenance advice. Please see specific information pulled into the AVS if appropriate.

## 2023-12-08 ENCOUNTER — OFFICE VISIT (OUTPATIENT)
Dept: FAMILY MEDICINE CLINIC | Facility: CLINIC | Age: 53
End: 2023-12-08
Payer: COMMERCIAL

## 2023-12-08 VITALS
BODY MASS INDEX: 25.9 KG/M2 | HEART RATE: 89 BPM | TEMPERATURE: 97.7 F | HEIGHT: 67 IN | WEIGHT: 165 LBS | SYSTOLIC BLOOD PRESSURE: 134 MMHG | DIASTOLIC BLOOD PRESSURE: 80 MMHG | OXYGEN SATURATION: 98 %

## 2023-12-08 DIAGNOSIS — E78.5 HYPERLIPIDEMIA, UNSPECIFIED HYPERLIPIDEMIA TYPE: ICD-10-CM

## 2023-12-08 DIAGNOSIS — Z12.5 SCREENING FOR MALIGNANT NEOPLASM OF PROSTATE: ICD-10-CM

## 2023-12-08 DIAGNOSIS — I10 ESSENTIAL HYPERTENSION: Primary | ICD-10-CM

## 2023-12-08 DIAGNOSIS — R73.02 IMPAIRED GLUCOSE TOLERANCE: ICD-10-CM

## 2023-12-08 LAB
ALBUMIN SERPL-MCNC: 5 G/DL (ref 3.5–5.2)
ALBUMIN/GLOB SERPL: 2.6 G/DL
ALP SERPL-CCNC: 59 U/L (ref 39–117)
ALT SERPL W P-5'-P-CCNC: 30 U/L (ref 1–41)
ANION GAP SERPL CALCULATED.3IONS-SCNC: 10 MMOL/L (ref 5–15)
AST SERPL-CCNC: 16 U/L (ref 1–40)
BACTERIA UR QL AUTO: NORMAL /HPF
BASOPHILS # BLD AUTO: 0.06 10*3/MM3 (ref 0–0.2)
BASOPHILS NFR BLD AUTO: 0.9 % (ref 0–1.5)
BILIRUB SERPL-MCNC: 0.3 MG/DL (ref 0–1.2)
BILIRUB UR QL STRIP: NEGATIVE
BUN SERPL-MCNC: 11 MG/DL (ref 6–20)
BUN/CREAT SERPL: 9.1 (ref 7–25)
CALCIUM SPEC-SCNC: 9.5 MG/DL (ref 8.6–10.5)
CHLORIDE SERPL-SCNC: 104 MMOL/L (ref 98–107)
CHOLEST SERPL-MCNC: 149 MG/DL (ref 0–200)
CLARITY UR: CLEAR
CO2 SERPL-SCNC: 26 MMOL/L (ref 22–29)
COLOR UR: YELLOW
CREAT SERPL-MCNC: 1.21 MG/DL (ref 0.76–1.27)
DEPRECATED RDW RBC AUTO: 40.1 FL (ref 37–54)
EGFRCR SERPLBLD CKD-EPI 2021: 72 ML/MIN/1.73
EOSINOPHIL # BLD AUTO: 0.25 10*3/MM3 (ref 0–0.4)
EOSINOPHIL NFR BLD AUTO: 3.6 % (ref 0.3–6.2)
ERYTHROCYTE [DISTWIDTH] IN BLOOD BY AUTOMATED COUNT: 12.9 % (ref 12.3–15.4)
GLOBULIN UR ELPH-MCNC: 1.9 GM/DL
GLUCOSE SERPL-MCNC: 133 MG/DL (ref 65–99)
GLUCOSE UR STRIP-MCNC: NEGATIVE MG/DL
HBA1C MFR BLD: 6.5 % (ref 4.8–5.6)
HCT VFR BLD AUTO: 47.2 % (ref 37.5–51)
HDLC SERPL-MCNC: 38 MG/DL (ref 40–60)
HGB BLD-MCNC: 16.1 G/DL (ref 13–17.7)
HGB UR QL STRIP.AUTO: NEGATIVE
HOLD SPECIMEN: NORMAL
HYALINE CASTS UR QL AUTO: NORMAL /LPF
IMM GRANULOCYTES # BLD AUTO: 0.02 10*3/MM3 (ref 0–0.05)
IMM GRANULOCYTES NFR BLD AUTO: 0.3 % (ref 0–0.5)
KETONES UR QL STRIP: NEGATIVE
LDLC SERPL CALC-MCNC: 98 MG/DL (ref 0–100)
LDLC/HDLC SERPL: 2.58 {RATIO}
LEUKOCYTE ESTERASE UR QL STRIP.AUTO: ABNORMAL
LYMPHOCYTES # BLD AUTO: 1.83 10*3/MM3 (ref 0.7–3.1)
LYMPHOCYTES NFR BLD AUTO: 26.3 % (ref 19.6–45.3)
MCH RBC QN AUTO: 29.9 PG (ref 26.6–33)
MCHC RBC AUTO-ENTMCNC: 34.1 G/DL (ref 31.5–35.7)
MCV RBC AUTO: 87.7 FL (ref 79–97)
MONOCYTES # BLD AUTO: 0.67 10*3/MM3 (ref 0.1–0.9)
MONOCYTES NFR BLD AUTO: 9.6 % (ref 5–12)
NEUTROPHILS NFR BLD AUTO: 4.13 10*3/MM3 (ref 1.7–7)
NEUTROPHILS NFR BLD AUTO: 59.3 % (ref 42.7–76)
NITRITE UR QL STRIP: NEGATIVE
NRBC BLD AUTO-RTO: 0 /100 WBC (ref 0–0.2)
PH UR STRIP.AUTO: 7 [PH] (ref 5–8)
PLATELET # BLD AUTO: 287 10*3/MM3 (ref 140–450)
PMV BLD AUTO: 10.5 FL (ref 6–12)
POTASSIUM SERPL-SCNC: 4.8 MMOL/L (ref 3.5–5.2)
PROT SERPL-MCNC: 6.9 G/DL (ref 6–8.5)
PROT UR QL STRIP: NEGATIVE
PSA SERPL-MCNC: 2.18 NG/ML (ref 0–4)
RBC # BLD AUTO: 5.38 10*6/MM3 (ref 4.14–5.8)
RBC # UR STRIP: NORMAL /HPF
REF LAB TEST METHOD: NORMAL
SODIUM SERPL-SCNC: 140 MMOL/L (ref 136–145)
SP GR UR STRIP: 1.01 (ref 1–1.03)
SQUAMOUS #/AREA URNS HPF: NORMAL /HPF
TRIGL SERPL-MCNC: 64 MG/DL (ref 0–150)
UROBILINOGEN UR QL STRIP: ABNORMAL
VLDLC SERPL-MCNC: 13 MG/DL (ref 5–40)
WBC # UR STRIP: NORMAL /HPF
WBC NRBC COR # BLD AUTO: 6.96 10*3/MM3 (ref 3.4–10.8)

## 2023-12-08 PROCEDURE — 80053 COMPREHEN METABOLIC PANEL: CPT | Performed by: NURSE PRACTITIONER

## 2023-12-08 PROCEDURE — 83036 HEMOGLOBIN GLYCOSYLATED A1C: CPT | Performed by: NURSE PRACTITIONER

## 2023-12-08 PROCEDURE — 81001 URINALYSIS AUTO W/SCOPE: CPT | Performed by: NURSE PRACTITIONER

## 2023-12-08 PROCEDURE — 80061 LIPID PANEL: CPT | Performed by: NURSE PRACTITIONER

## 2023-12-08 PROCEDURE — G0103 PSA SCREENING: HCPCS | Performed by: NURSE PRACTITIONER

## 2023-12-08 PROCEDURE — 99214 OFFICE O/P EST MOD 30 MIN: CPT | Performed by: NURSE PRACTITIONER

## 2023-12-08 PROCEDURE — 85025 COMPLETE CBC W/AUTO DIFF WBC: CPT | Performed by: NURSE PRACTITIONER

## 2023-12-08 RX ORDER — LISINOPRIL 20 MG/1
20 TABLET ORAL DAILY
Qty: 90 TABLET | Refills: 1 | Status: SHIPPED | OUTPATIENT
Start: 2023-12-08

## 2023-12-08 RX ORDER — ROSUVASTATIN CALCIUM 5 MG/1
5 TABLET, COATED ORAL DAILY
Qty: 90 TABLET | Refills: 1 | Status: SHIPPED | OUTPATIENT
Start: 2023-12-08

## 2023-12-08 NOTE — PROGRESS NOTES
Chief Complaint  Hypertension (Refills and labs, patient is fasting)    Subjective        Past Medical History:   Diagnosis Date    GERD (gastroesophageal reflux disease)     Hyperlipidemia     Hypertension      Social History     Tobacco Use    Smoking status: Never     Passive exposure: Past    Smokeless tobacco: Current     Types: Chew    Tobacco comments:     use chewing tobacco   Vaping Use    Vaping Use: Never used   Substance Use Topics    Alcohol use: Yes     Alcohol/week: 1.0 standard drink of alcohol     Types: 1 Cans of beer per week     Comment: occasionally    Drug use: Never      Current Outpatient Medications on File Prior to Visit   Medication Sig    esomeprazole (nexIUM) 20 MG capsule Take 1 capsule by mouth Daily.    [DISCONTINUED] lisinopril (PRINIVIL,ZESTRIL) 20 MG tablet Take 1 tablet by mouth Daily.    [DISCONTINUED] rosuvastatin (CRESTOR) 5 MG tablet Take 1 tablet by mouth Daily.     No current facility-administered medications on file prior to visit.      No Known Allergies   Health Maintenance Due   Topic Date Due    ANNUAL PHYSICAL  Never done    ZOSTER VACCINE (1 of 2) Never done    COVID-19 Vaccine (3 - 2023-24 season) 09/01/2023      Danilo Crane presents to [facility]  Hypertension  Pertinent negatives include no chest pain, palpitations or shortness of breath.     Patient presents to the office today to follow-up on chronic health conditions and obtain refills of medications.  Patient is fasting for labs.    HTN: normotensive in office. Denies dry cough. May wake once through the night to void.     HLD: denies muscle aches. He will occasionally have foot cramping and notes he doesn't drink as much water as he should, notes more with increased activity.     Prediabetes: pt notes he isn't eating as much, down about 4lbs but eats same foods.     Answers submitted by the patient for this visit:  Primary Reason for Visit (Submitted on  "12/8/2023)  What is the primary reason for your visit?: Other  Other (Submitted on 12/8/2023)  Please describe your symptoms.: routine checkup for medication refill and bloodwork  Have you had these symptoms before?: No  How long have you been having these symptoms?: 1-4 days    Objective   Vital Signs:   /80 (BP Location: Left arm)   Pulse 89   Temp 97.7 °F (36.5 °C)   Ht 170.2 cm (67\")   Wt 74.8 kg (165 lb)   SpO2 98%   BMI 25.84 kg/m²     Review of Systems   Respiratory:  Negative for shortness of breath.    Cardiovascular:  Negative for chest pain and palpitations.   Gastrointestinal:  Negative for abdominal pain, nausea and vomiting.   Genitourinary:  Negative for nocturia.   Neurological:  Negative for dizziness, light-headedness and headache.      Physical Exam  Vitals reviewed.   Constitutional:       General: He is not in acute distress.     Appearance: Normal appearance. He is well-developed.   HENT:      Head: Normocephalic and atraumatic.   Eyes:      Conjunctiva/sclera: Conjunctivae normal.      Pupils: Pupils are equal, round, and reactive to light.   Cardiovascular:      Rate and Rhythm: Normal rate and regular rhythm.      Heart sounds: Normal heart sounds.   Pulmonary:      Effort: Pulmonary effort is normal.      Breath sounds: Normal breath sounds.   Musculoskeletal:      Cervical back: Neck supple.      Right lower leg: No edema.      Left lower leg: No edema.   Skin:     General: Skin is warm and dry.   Neurological:      Mental Status: He is alert and oriented to person, place, and time.   Psychiatric:         Mood and Affect: Mood and affect normal.         Behavior: Behavior normal.         Thought Content: Thought content normal.         Judgment: Judgment normal.        Result Review :   The following data was reviewed by: NAHID Baron on 12/08/2023:  Common labs          5/24/2023    10:04   Common Labs   Glucose 99    BUN 10    Creatinine 1.06    Sodium 139  "   Potassium 4.4    Chloride 103    Calcium 9.7    Albumin 4.6    Total Bilirubin 0.5    Alkaline Phosphatase 55    AST (SGOT) 17    ALT (SGPT) 35    WBC 6.56    Hemoglobin 16.3    Hematocrit 47.5    Platelets 280    Total Cholesterol 144    Triglycerides 114    HDL Cholesterol 35    LDL Cholesterol  88    Hemoglobin A1C 6.10                  Assessment and Plan    Diagnoses and all orders for this visit:    1. Essential hypertension (Primary)  -     CBC & Differential  -     Comprehensive Metabolic Panel  -     Urinalysis With Culture If Indicated -  -     lisinopril (PRINIVIL,ZESTRIL) 20 MG tablet; Take 1 tablet by mouth Daily.  Dispense: 90 tablet; Refill: 1    2. Hyperlipidemia, unspecified hyperlipidemia type  -     Lipid Panel  -     rosuvastatin (CRESTOR) 5 MG tablet; Take 1 tablet by mouth Daily.  Dispense: 90 tablet; Refill: 1    3. Impaired glucose tolerance  -     Hemoglobin A1c    4. Screening for malignant neoplasm of prostate  -     PSA Screen      Refill current medications. Fasting labs pending. Reduce carbohydrates and follow heart healthy diet.        Follow Up   Return in about 6 months (around 6/8/2024) for Refills and fasting labs.  Patient was given instructions and counseling regarding his condition or for health maintenance advice. Please see specific information pulled into the AVS if appropriate.

## 2023-12-08 NOTE — PROGRESS NOTES
Venipuncture Blood Specimen Collection  Venipuncture performed in left arm by Carol Gibson with good hemostasis. Patient tolerated the procedure well without complications.   12/08/23   Carol Gibson

## 2024-05-19 DIAGNOSIS — E78.5 HYPERLIPIDEMIA, UNSPECIFIED HYPERLIPIDEMIA TYPE: ICD-10-CM

## 2024-05-19 DIAGNOSIS — I10 ESSENTIAL HYPERTENSION: ICD-10-CM

## 2024-05-21 DIAGNOSIS — E78.5 HYPERLIPIDEMIA, UNSPECIFIED HYPERLIPIDEMIA TYPE: ICD-10-CM

## 2024-05-21 DIAGNOSIS — I10 ESSENTIAL HYPERTENSION: ICD-10-CM

## 2024-05-21 RX ORDER — LISINOPRIL 20 MG/1
20 TABLET ORAL DAILY
Qty: 30 TABLET | Refills: 0 | Status: SHIPPED | OUTPATIENT
Start: 2024-05-21

## 2024-05-21 RX ORDER — ROSUVASTATIN CALCIUM 5 MG/1
5 TABLET, COATED ORAL DAILY
Qty: 30 TABLET | Refills: 0 | Status: SHIPPED | OUTPATIENT
Start: 2024-05-21

## 2024-06-07 ENCOUNTER — OFFICE VISIT (OUTPATIENT)
Dept: FAMILY MEDICINE CLINIC | Facility: CLINIC | Age: 54
End: 2024-06-07
Payer: COMMERCIAL

## 2024-06-07 VITALS
SYSTOLIC BLOOD PRESSURE: 122 MMHG | HEART RATE: 82 BPM | WEIGHT: 163.2 LBS | HEIGHT: 67 IN | OXYGEN SATURATION: 99 % | DIASTOLIC BLOOD PRESSURE: 78 MMHG | TEMPERATURE: 97.9 F | BODY MASS INDEX: 25.62 KG/M2

## 2024-06-07 DIAGNOSIS — I10 ESSENTIAL HYPERTENSION: ICD-10-CM

## 2024-06-07 DIAGNOSIS — E78.5 HYPERLIPIDEMIA, UNSPECIFIED HYPERLIPIDEMIA TYPE: ICD-10-CM

## 2024-06-07 DIAGNOSIS — E11.9 TYPE 2 DIABETES MELLITUS WITHOUT COMPLICATION, WITHOUT LONG-TERM CURRENT USE OF INSULIN: Primary | ICD-10-CM

## 2024-06-07 LAB
ALBUMIN SERPL-MCNC: 4.9 G/DL (ref 3.5–5.2)
ALBUMIN UR-MCNC: <1.2 MG/DL
ALBUMIN/GLOB SERPL: 2.1 G/DL
ALP SERPL-CCNC: 61 U/L (ref 39–117)
ALT SERPL W P-5'-P-CCNC: 29 U/L (ref 1–41)
ANION GAP SERPL CALCULATED.3IONS-SCNC: 10 MMOL/L (ref 5–15)
AST SERPL-CCNC: 19 U/L (ref 1–40)
BASOPHILS # BLD AUTO: 0.05 10*3/MM3 (ref 0–0.2)
BASOPHILS NFR BLD AUTO: 0.7 % (ref 0–1.5)
BILIRUB SERPL-MCNC: 0.4 MG/DL (ref 0–1.2)
BILIRUB UR QL STRIP: NEGATIVE
BUN SERPL-MCNC: 13 MG/DL (ref 6–20)
BUN/CREAT SERPL: 13.4 (ref 7–25)
CALCIUM SPEC-SCNC: 9.8 MG/DL (ref 8.6–10.5)
CHLORIDE SERPL-SCNC: 102 MMOL/L (ref 98–107)
CHOLEST SERPL-MCNC: 144 MG/DL (ref 0–200)
CLARITY UR: CLEAR
CO2 SERPL-SCNC: 27 MMOL/L (ref 22–29)
COLOR UR: YELLOW
CREAT SERPL-MCNC: 0.97 MG/DL (ref 0.76–1.27)
DEPRECATED RDW RBC AUTO: 41.2 FL (ref 37–54)
EGFRCR SERPLBLD CKD-EPI 2021: 93.9 ML/MIN/1.73
EOSINOPHIL # BLD AUTO: 0.19 10*3/MM3 (ref 0–0.4)
EOSINOPHIL NFR BLD AUTO: 2.8 % (ref 0.3–6.2)
ERYTHROCYTE [DISTWIDTH] IN BLOOD BY AUTOMATED COUNT: 12.8 % (ref 12.3–15.4)
GLOBULIN UR ELPH-MCNC: 2.3 GM/DL
GLUCOSE SERPL-MCNC: 110 MG/DL (ref 65–99)
GLUCOSE UR STRIP-MCNC: NEGATIVE MG/DL
HBA1C MFR BLD: 6.3 % (ref 4.8–5.6)
HCT VFR BLD AUTO: 49.9 % (ref 37.5–51)
HDLC SERPL-MCNC: 38 MG/DL (ref 40–60)
HGB BLD-MCNC: 16.7 G/DL (ref 13–17.7)
HGB UR QL STRIP.AUTO: NEGATIVE
HOLD SPECIMEN: NORMAL
IMM GRANULOCYTES # BLD AUTO: 0.02 10*3/MM3 (ref 0–0.05)
IMM GRANULOCYTES NFR BLD AUTO: 0.3 % (ref 0–0.5)
KETONES UR QL STRIP: NEGATIVE
LDLC SERPL CALC-MCNC: 91 MG/DL (ref 0–100)
LDLC/HDLC SERPL: 2.37 {RATIO}
LEUKOCYTE ESTERASE UR QL STRIP.AUTO: NEGATIVE
LYMPHOCYTES # BLD AUTO: 1.96 10*3/MM3 (ref 0.7–3.1)
LYMPHOCYTES NFR BLD AUTO: 28.4 % (ref 19.6–45.3)
MCH RBC QN AUTO: 29.6 PG (ref 26.6–33)
MCHC RBC AUTO-ENTMCNC: 33.5 G/DL (ref 31.5–35.7)
MCV RBC AUTO: 88.5 FL (ref 79–97)
MONOCYTES # BLD AUTO: 0.69 10*3/MM3 (ref 0.1–0.9)
MONOCYTES NFR BLD AUTO: 10 % (ref 5–12)
NEUTROPHILS NFR BLD AUTO: 3.98 10*3/MM3 (ref 1.7–7)
NEUTROPHILS NFR BLD AUTO: 57.8 % (ref 42.7–76)
NITRITE UR QL STRIP: NEGATIVE
NRBC BLD AUTO-RTO: 0 /100 WBC (ref 0–0.2)
PH UR STRIP.AUTO: 7 [PH] (ref 5–8)
PLATELET # BLD AUTO: 260 10*3/MM3 (ref 140–450)
PMV BLD AUTO: 10.7 FL (ref 6–12)
POTASSIUM SERPL-SCNC: 4.9 MMOL/L (ref 3.5–5.2)
PROT SERPL-MCNC: 7.2 G/DL (ref 6–8.5)
PROT UR QL STRIP: NEGATIVE
RBC # BLD AUTO: 5.64 10*6/MM3 (ref 4.14–5.8)
SODIUM SERPL-SCNC: 139 MMOL/L (ref 136–145)
SP GR UR STRIP: 1.01 (ref 1–1.03)
TRIGL SERPL-MCNC: 79 MG/DL (ref 0–150)
TSH SERPL DL<=0.05 MIU/L-ACNC: 0.99 UIU/ML (ref 0.27–4.2)
UROBILINOGEN UR QL STRIP: NORMAL
VLDLC SERPL-MCNC: 15 MG/DL (ref 5–40)
WBC NRBC COR # BLD AUTO: 6.89 10*3/MM3 (ref 3.4–10.8)

## 2024-06-07 PROCEDURE — 83036 HEMOGLOBIN GLYCOSYLATED A1C: CPT | Performed by: NURSE PRACTITIONER

## 2024-06-07 PROCEDURE — 99214 OFFICE O/P EST MOD 30 MIN: CPT | Performed by: NURSE PRACTITIONER

## 2024-06-07 PROCEDURE — 81003 URINALYSIS AUTO W/O SCOPE: CPT | Performed by: NURSE PRACTITIONER

## 2024-06-07 PROCEDURE — 80061 LIPID PANEL: CPT | Performed by: NURSE PRACTITIONER

## 2024-06-07 PROCEDURE — 82043 UR ALBUMIN QUANTITATIVE: CPT | Performed by: NURSE PRACTITIONER

## 2024-06-07 PROCEDURE — 80050 GENERAL HEALTH PANEL: CPT | Performed by: NURSE PRACTITIONER

## 2024-06-07 RX ORDER — ROSUVASTATIN CALCIUM 5 MG/1
5 TABLET, COATED ORAL DAILY
Qty: 90 TABLET | OUTPATIENT
Start: 2024-06-07

## 2024-06-07 RX ORDER — LISINOPRIL 20 MG/1
20 TABLET ORAL DAILY
Qty: 90 TABLET | Refills: 1 | Status: SHIPPED | OUTPATIENT
Start: 2024-06-07

## 2024-06-07 RX ORDER — LISINOPRIL 20 MG/1
20 TABLET ORAL DAILY
Qty: 90 TABLET | OUTPATIENT
Start: 2024-06-07

## 2024-06-07 RX ORDER — ROSUVASTATIN CALCIUM 5 MG/1
5 TABLET, COATED ORAL DAILY
Qty: 90 TABLET | Refills: 1 | Status: SHIPPED | OUTPATIENT
Start: 2024-06-07

## 2024-06-07 NOTE — PROGRESS NOTES
..  Venipuncture Blood Specimen Collection  Venipuncture performed in LT arm by Mili Hercules MA with good hemostasis. Patient tolerated the procedure well without complications.   06/07/24   Mili Hercules MA

## 2024-06-07 NOTE — PROGRESS NOTES
Chief Complaint  Hypertension and Hyperlipidemia    PHQ-2 Total Score: 0    Subjective        Past Medical History:   Diagnosis Date    GERD (gastroesophageal reflux disease)     Hyperlipidemia     Hypertension      Social History     Tobacco Use    Smoking status: Never     Passive exposure: Past    Smokeless tobacco: Current     Types: Chew    Tobacco comments:     use chewing tobacco   Vaping Use    Vaping status: Never Used   Substance Use Topics    Alcohol use: Yes     Alcohol/week: 1.0 standard drink of alcohol     Types: 1 Cans of beer per week     Comment: occasionally    Drug use: Never      Current Outpatient Medications on File Prior to Visit   Medication Sig    esomeprazole (nexIUM) 20 MG capsule Take 1 capsule by mouth Daily.    [DISCONTINUED] lisinopril (PRINIVIL,ZESTRIL) 20 MG tablet TAKE 1 TABLET BY MOUTH DAILY    [DISCONTINUED] rosuvastatin (CRESTOR) 5 MG tablet TAKE 1 TABLET BY MOUTH DAILY     No current facility-administered medications on file prior to visit.      No Known Allergies   Health Maintenance Due   Topic Date Due    DIABETIC EYE EXAM  Never done    Hepatitis B (1 of 3 - 19+ 3-dose series) Never done    URINE MICROALBUMIN  01/28/2020    ANNUAL PHYSICAL  Never done    DIABETIC FOOT EXAM  Never done    ZOSTER VACCINE (1 of 2) Never done    COVID-19 Vaccine (3 - 2023-24 season) 09/01/2023    HEMOGLOBIN A1C  06/08/2024      Danilo Crane presents to [facility]  Hypertension  This is a recurrent problem. The current episode started more than 1 year ago. The problem has been stable since onset. Pertinent negatives include no anxiety, blurred vision, chest pain, headaches, malaise/fatigue, orthopnea, palpitations, peripheral edema or shortness of breath. There are no associated agents to hypertension. Compliance problems include diet.      Patient presents to the office today to follow-up on chronic health conditions and obtain refills of medications.   "Patient is fasting for labs.    HTN: normotensive in office. No symptoms of elevated BP.     DM: pt states he does not drink soft drinks, limited sweets. Has not made any significant diet changes since last labs indicating diabetes. Works as a . Notes some fatigue after eating.     HLD: no issues with muscle aches or cramps. If really busy at work he will have some leg cramps but also notes decreased water intake on those days.     Recommend patient to quit use of chewing tobacco and also to get regular dental exams to monitor for oral cancers.    Objective   Vital Signs:   /78 (BP Location: Left arm, Patient Position: Sitting)   Pulse 82   Temp 97.9 °F (36.6 °C) (Temporal)   Ht 170.2 cm (67\")   Wt 74 kg (163 lb 3.2 oz)   SpO2 99%   BMI 25.56 kg/m²     Review of Systems   Constitutional:  Negative for malaise/fatigue.   Eyes:  Negative for blurred vision.   Respiratory:  Negative for shortness of breath.    Cardiovascular:  Negative for chest pain, palpitations and orthopnea.      Physical Exam  Vitals reviewed.   Constitutional:       General: He is not in acute distress.     Appearance: Normal appearance. He is well-developed.   Eyes:      Conjunctiva/sclera: Conjunctivae normal.      Pupils: Pupils are equal, round, and reactive to light.   Cardiovascular:      Rate and Rhythm: Normal rate and regular rhythm.      Pulses:           Dorsalis pedis pulses are 2+ on the right side and 2+ on the left side.      Heart sounds: Normal heart sounds.   Pulmonary:      Effort: Pulmonary effort is normal.      Breath sounds: Normal breath sounds.   Musculoskeletal:      Cervical back: Neck supple.      Right lower leg: No edema.      Left lower leg: No edema.      Right foot: No bunion.      Left foot: No bunion.        Feet:    Feet:      Right foot:      Protective Sensation: 9 sites tested.  9 sites sensed.      Skin integrity: Skin integrity normal. No ulcer, blister or callus.      Toenail " Condition: Right toenails are normal.      Left foot:      Protective Sensation: 9 sites tested.  9 sites sensed.      Skin integrity: Skin integrity normal. Callus present. No ulcer or blister.      Toenail Condition: Left toenails are normal.      Comments:      Skin:     General: Skin is warm and dry.   Neurological:      Mental Status: He is alert and oriented to person, place, and time.   Psychiatric:         Mood and Affect: Mood and affect normal.         Behavior: Behavior normal.         Thought Content: Thought content normal.         Judgment: Judgment normal.        Result Review :   The following data was reviewed by: NAHID Baron on 06/07/2024:  Common labs          12/8/2023    09:23   Common Labs   Glucose 133    BUN 11    Creatinine 1.21    Sodium 140    Potassium 4.8    Chloride 104    Calcium 9.5    Albumin 5.0    Total Bilirubin 0.3    Alkaline Phosphatase 59    AST (SGOT) 16    ALT (SGPT) 30    WBC 6.96    Hemoglobin 16.1    Hematocrit 47.2    Platelets 287    Total Cholesterol 149    Triglycerides 64    HDL Cholesterol 38    LDL Cholesterol  98    Hemoglobin A1C 6.50    PSA 2.180                  Assessment and Plan    Diagnoses and all orders for this visit:    1. Type 2 diabetes mellitus without complication, without long-term current use of insulin (Primary)  -     Urinalysis With Culture If Indicated -  -     Comprehensive Metabolic Panel  -     Hemoglobin A1c  -     TSH Rfx On Abnormal To Free T4  -     MicroAlbumin, Urine, Random - Urine, Clean Catch    2. Essential hypertension  -     lisinopril (PRINIVIL,ZESTRIL) 20 MG tablet; Take 1 tablet by mouth Daily.  Dispense: 90 tablet; Refill: 1  -     CBC & Differential    3. Hyperlipidemia, unspecified hyperlipidemia type  -     rosuvastatin (CRESTOR) 5 MG tablet; Take 1 tablet by mouth Daily.  Dispense: 90 tablet; Refill: 1  -     Lipid Panel      BMI is >= 25 and <30. (Overweight) The following options were offered after  discussion;: weight loss educational material (shared in after visit summary)       Follow Up   Return in about 6 months (around 12/7/2024) for Refills and fasting labs.  Patient was given instructions and counseling regarding his condition or for health maintenance advice. Please see specific information pulled into the AVS if appropriate.

## 2024-12-05 ENCOUNTER — TELEPHONE (OUTPATIENT)
Dept: FAMILY MEDICINE CLINIC | Facility: CLINIC | Age: 54
End: 2024-12-05

## 2024-12-05 NOTE — TELEPHONE ENCOUNTER
"    Caller: Danilo Crane \"Morales\"    Relationship to patient: Self    Best call back number: [phone]    Patient is needing: PATIENT CALLED IN AND SAID HE IS VERY SORRY FOR MISSING HIS APPOINTMENT EARLIER TODAY. HE SAID HIS WORK SCHEDULE IS CHANGING ALL THE TIME AND HE WILL CALL BACK TO RESCHEDULE LATER.          "

## 2025-01-21 DIAGNOSIS — I10 ESSENTIAL HYPERTENSION: ICD-10-CM

## 2025-01-21 DIAGNOSIS — E78.5 HYPERLIPIDEMIA, UNSPECIFIED HYPERLIPIDEMIA TYPE: ICD-10-CM

## 2025-01-22 RX ORDER — LISINOPRIL 20 MG/1
20 TABLET ORAL DAILY
Qty: 90 TABLET | Refills: 1 | OUTPATIENT
Start: 2025-01-22

## 2025-01-22 RX ORDER — ROSUVASTATIN CALCIUM 5 MG/1
5 TABLET, COATED ORAL DAILY
Qty: 90 TABLET | Refills: 1 | OUTPATIENT
Start: 2025-01-22

## 2025-01-22 RX ORDER — ROSUVASTATIN CALCIUM 5 MG/1
5 TABLET, COATED ORAL DAILY
Qty: 30 TABLET | OUTPATIENT
Start: 2025-01-22

## 2025-01-22 RX ORDER — LISINOPRIL 20 MG/1
20 TABLET ORAL DAILY
Qty: 30 TABLET | OUTPATIENT
Start: 2025-01-22

## 2025-03-17 ENCOUNTER — RESULTS FOLLOW-UP (OUTPATIENT)
Dept: FAMILY MEDICINE CLINIC | Facility: CLINIC | Age: 55
End: 2025-03-17

## 2025-03-17 ENCOUNTER — OFFICE VISIT (OUTPATIENT)
Dept: FAMILY MEDICINE CLINIC | Facility: CLINIC | Age: 55
End: 2025-03-17
Payer: COMMERCIAL

## 2025-03-17 VITALS
TEMPERATURE: 98 F | BODY MASS INDEX: 27.51 KG/M2 | SYSTOLIC BLOOD PRESSURE: 122 MMHG | HEART RATE: 69 BPM | OXYGEN SATURATION: 100 % | WEIGHT: 175.3 LBS | DIASTOLIC BLOOD PRESSURE: 80 MMHG | HEIGHT: 67 IN

## 2025-03-17 DIAGNOSIS — Z12.5 SCREENING FOR MALIGNANT NEOPLASM OF PROSTATE: ICD-10-CM

## 2025-03-17 DIAGNOSIS — E11.9 TYPE 2 DIABETES MELLITUS WITHOUT COMPLICATION, WITHOUT LONG-TERM CURRENT USE OF INSULIN: ICD-10-CM

## 2025-03-17 DIAGNOSIS — I10 ESSENTIAL HYPERTENSION: ICD-10-CM

## 2025-03-17 DIAGNOSIS — Z72.0 TOBACCO CHEW USE: ICD-10-CM

## 2025-03-17 DIAGNOSIS — Z00.00 ANNUAL PHYSICAL EXAM: Primary | ICD-10-CM

## 2025-03-17 DIAGNOSIS — E78.5 HYPERLIPIDEMIA, UNSPECIFIED HYPERLIPIDEMIA TYPE: ICD-10-CM

## 2025-03-17 LAB
ALBUMIN SERPL-MCNC: 4.7 G/DL (ref 3.5–5.2)
ALBUMIN UR-MCNC: <1.2 MG/DL
ALBUMIN/GLOB SERPL: 1.6 G/DL
ALP SERPL-CCNC: 67 U/L (ref 39–117)
ALT SERPL W P-5'-P-CCNC: 38 U/L (ref 1–41)
ANION GAP SERPL CALCULATED.3IONS-SCNC: 12 MMOL/L (ref 5–15)
AST SERPL-CCNC: 23 U/L (ref 1–40)
BASOPHILS # BLD AUTO: 0.05 10*3/MM3 (ref 0–0.2)
BASOPHILS NFR BLD AUTO: 0.6 % (ref 0–1.5)
BILIRUB SERPL-MCNC: 0.3 MG/DL (ref 0–1.2)
BILIRUB UR QL STRIP: NEGATIVE
BUN SERPL-MCNC: 15 MG/DL (ref 6–20)
BUN/CREAT SERPL: 12.7 (ref 7–25)
CALCIUM SPEC-SCNC: 9.6 MG/DL (ref 8.6–10.5)
CHLORIDE SERPL-SCNC: 102 MMOL/L (ref 98–107)
CHOLEST SERPL-MCNC: 182 MG/DL (ref 0–200)
CLARITY UR: CLEAR
CO2 SERPL-SCNC: 27 MMOL/L (ref 22–29)
COLOR UR: YELLOW
CREAT SERPL-MCNC: 1.18 MG/DL (ref 0.76–1.27)
CREAT UR-MCNC: 25.4 MG/DL
DEPRECATED RDW RBC AUTO: 42.4 FL (ref 37–54)
EGFRCR SERPLBLD CKD-EPI 2021: 73.8 ML/MIN/1.73
EOSINOPHIL # BLD AUTO: 0.24 10*3/MM3 (ref 0–0.4)
EOSINOPHIL NFR BLD AUTO: 2.7 % (ref 0.3–6.2)
ERYTHROCYTE [DISTWIDTH] IN BLOOD BY AUTOMATED COUNT: 13.1 % (ref 12.3–15.4)
GLOBULIN UR ELPH-MCNC: 2.9 GM/DL
GLUCOSE SERPL-MCNC: 119 MG/DL (ref 65–99)
GLUCOSE UR STRIP-MCNC: NEGATIVE MG/DL
HBA1C MFR BLD: 6.5 % (ref 4.8–5.6)
HCT VFR BLD AUTO: 51.2 % (ref 37.5–51)
HDLC SERPL-MCNC: 37 MG/DL (ref 40–60)
HGB BLD-MCNC: 17.2 G/DL (ref 13–17.7)
HGB UR QL STRIP.AUTO: NEGATIVE
HOLD SPECIMEN: NORMAL
IMM GRANULOCYTES # BLD AUTO: 0.03 10*3/MM3 (ref 0–0.05)
IMM GRANULOCYTES NFR BLD AUTO: 0.3 % (ref 0–0.5)
KETONES UR QL STRIP: NEGATIVE
LDLC SERPL CALC-MCNC: 120 MG/DL (ref 0–100)
LDLC/HDLC SERPL: 3.17 {RATIO}
LEUKOCYTE ESTERASE UR QL STRIP.AUTO: NEGATIVE
LYMPHOCYTES # BLD AUTO: 2.46 10*3/MM3 (ref 0.7–3.1)
LYMPHOCYTES NFR BLD AUTO: 27.7 % (ref 19.6–45.3)
MCH RBC QN AUTO: 29.9 PG (ref 26.6–33)
MCHC RBC AUTO-ENTMCNC: 33.6 G/DL (ref 31.5–35.7)
MCV RBC AUTO: 88.9 FL (ref 79–97)
MICROALBUMIN/CREAT UR: NORMAL MG/G{CREAT}
MONOCYTES # BLD AUTO: 0.85 10*3/MM3 (ref 0.1–0.9)
MONOCYTES NFR BLD AUTO: 9.6 % (ref 5–12)
NEUTROPHILS NFR BLD AUTO: 5.26 10*3/MM3 (ref 1.7–7)
NEUTROPHILS NFR BLD AUTO: 59.1 % (ref 42.7–76)
NITRITE UR QL STRIP: NEGATIVE
NRBC BLD AUTO-RTO: 0 /100 WBC (ref 0–0.2)
PH UR STRIP.AUTO: 6.5 [PH] (ref 5–8)
PLATELET # BLD AUTO: 277 10*3/MM3 (ref 140–450)
PMV BLD AUTO: 10.3 FL (ref 6–12)
POTASSIUM SERPL-SCNC: 4.4 MMOL/L (ref 3.5–5.2)
PROT SERPL-MCNC: 7.6 G/DL (ref 6–8.5)
PROT UR QL STRIP: NEGATIVE
PSA SERPL-MCNC: 2.29 NG/ML (ref 0–4)
RBC # BLD AUTO: 5.76 10*6/MM3 (ref 4.14–5.8)
SODIUM SERPL-SCNC: 141 MMOL/L (ref 136–145)
SP GR UR STRIP: <=1.005 (ref 1–1.03)
TRIGL SERPL-MCNC: 139 MG/DL (ref 0–150)
TSH SERPL DL<=0.05 MIU/L-ACNC: 1.37 UIU/ML (ref 0.27–4.2)
UROBILINOGEN UR QL STRIP: NORMAL
VLDLC SERPL-MCNC: 25 MG/DL (ref 5–40)
WBC NRBC COR # BLD AUTO: 8.89 10*3/MM3 (ref 3.4–10.8)

## 2025-03-17 PROCEDURE — 80050 GENERAL HEALTH PANEL: CPT | Performed by: NURSE PRACTITIONER

## 2025-03-17 PROCEDURE — 81003 URINALYSIS AUTO W/O SCOPE: CPT | Performed by: NURSE PRACTITIONER

## 2025-03-17 PROCEDURE — 82570 ASSAY OF URINE CREATININE: CPT | Performed by: NURSE PRACTITIONER

## 2025-03-17 PROCEDURE — G0103 PSA SCREENING: HCPCS | Performed by: NURSE PRACTITIONER

## 2025-03-17 PROCEDURE — 83036 HEMOGLOBIN GLYCOSYLATED A1C: CPT | Performed by: NURSE PRACTITIONER

## 2025-03-17 PROCEDURE — 99396 PREV VISIT EST AGE 40-64: CPT | Performed by: NURSE PRACTITIONER

## 2025-03-17 PROCEDURE — 82043 UR ALBUMIN QUANTITATIVE: CPT | Performed by: NURSE PRACTITIONER

## 2025-03-17 PROCEDURE — 80061 LIPID PANEL: CPT | Performed by: NURSE PRACTITIONER

## 2025-03-17 RX ORDER — LISINOPRIL 20 MG/1
20 TABLET ORAL DAILY
Qty: 90 TABLET | Refills: 1 | Status: SHIPPED | OUTPATIENT
Start: 2025-03-17

## 2025-03-17 RX ORDER — ROSUVASTATIN CALCIUM 5 MG/1
5 TABLET, COATED ORAL DAILY
Qty: 90 TABLET | Refills: 1 | Status: SHIPPED | OUTPATIENT
Start: 2025-03-17

## 2025-03-17 NOTE — PROGRESS NOTES
Venipuncture Blood Specimen Collection  Venipuncture performed in left arm by Meg Woody with good hemostasis. Patient tolerated the procedure well without complications.   03/17/25   Meg Woody

## 2025-03-17 NOTE — PROGRESS NOTES
"Chief Complaint  Annual Exam, Diabetes, Hyperlipidemia, and Hypertension    Little interest or pleasure in doing things? Not at all   Feeling down, depressed, or hopeless? Not at all   PHQ-2 Total Score 0        History of Present Illness  Danilo Crane is a 54 y.o. male who presents to [facility] with a past medical history of  Past Medical History:   Diagnosis Date    GERD (gastroesophageal reflux disease)     Hyperlipidemia     Hypertension        HPI     The patient is a 54-year-old male here for an annual wellness visit.    He reports overall good health, with the exception of a slight increase in weight, which he attributes to dietary changes necessitated by his 12-hour work shifts. He does not experience any muscle aches or cramps associated with Crestor use. 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 He does not require a refill of Nexium as he purchases it over the counter at half the dose, which he finds effective. He is not currently on any medication for diabetes and reports no numbness or tingling in his feet. He has been advised to receive the pneumonia vaccine. He reports no chest pain, palpitations, or shortness of breath. His physical activity is primarily limited to his work duties, as he does not have time for regular exercise outside of work. He had a colonoscopy in 2022 and was advised to repeat the procedure in 5 years. He reports no family history of colon cancer.    FAMILY HISTORY  He reports no family history of colon cancer.    MEDICATIONS  Crestor, lisinopril, Nexium (over the counter)       Objective   Vital Signs:   Vitals:    03/17/25 0840   BP: 122/80   Pulse: 69   Temp: 98 °F (36.7 °C)   SpO2: 100%   Weight: 79.5 kg (175 lb 4.8 oz)   Height: 170.2 cm (67\")   PainSc: 0-No pain     Body mass " index is 27.46 kg/m².    Wt Readings from Last 3 Encounters:   03/17/25 79.5 kg (175 lb 4.8 oz)   06/07/24 74 kg (163 lb 3.2 oz)   12/08/23 74.8 kg (165 lb)     BP Readings from Last 3 Encounters:   03/17/25 122/80   06/07/24 122/78   12/08/23 134/80       Health Maintenance   Topic Date Due    DIABETIC FOOT EXAM  Never done    DIABETIC EYE EXAM  Never done    Hepatitis B (1 of 3 - 19+ 3-dose series) Never done    URINE MICROALBUMIN-CREATININE RATIO (uACR)  01/28/2020    ZOSTER VACCINE (1 of 2) Never done    HEMOGLOBIN A1C  12/07/2024    COVID-19 Vaccine (3 - 2024-25 season) 03/19/2025 (Originally 9/1/2024)    INFLUENZA VACCINE  03/31/2025 (Originally 7/1/2024)    Pneumococcal Vaccine 50+ (1 of 2 - PCV) 03/17/2026 (Originally [birth year]/1990)    LIPID PANEL  06/07/2025    BMI FOLLOWUP  06/07/2025    ANNUAL PHYSICAL  03/17/2026    COLORECTAL CANCER SCREENING  10/14/2027    TDAP/TD VACCINES (2 - Td or Tdap) 05/11/2032    HEPATITIS C SCREENING  Completed       Physical Exam  Vitals reviewed.   Constitutional:       General: He is not in acute distress.     Appearance: Normal appearance. He is well-developed.   HENT:      Head: Normocephalic and atraumatic.      Right Ear: External ear normal.      Left Ear: External ear normal.   Eyes:      Conjunctiva/sclera: Conjunctivae normal.      Pupils: Pupils are equal, round, and reactive to light.   Cardiovascular:      Rate and Rhythm: Normal rate and regular rhythm.      Heart sounds: Normal heart sounds.   Pulmonary:      Effort: Pulmonary effort is normal.      Breath sounds: Normal breath sounds.   Musculoskeletal:      Cervical back: Neck supple.      Right lower leg: No edema.      Left lower leg: No edema.   Skin:     General: Skin is warm and dry.   Neurological:      Mental Status: He is alert and oriented to person, place, and time.   Psychiatric:         Mood and Affect: Mood and affect normal.         Behavior: Behavior normal.         Thought Content: Thought content  normal.         Judgment: Judgment normal.            Result Review :  The following data was reviewed by: NAHID Baron on 03/17/2025:  Results  Laboratory Studies  A1c previously hit 6.5.     Common labs          6/7/2024    09:00   Common Labs   Glucose 110    BUN 13    Creatinine 0.97    Sodium 139    Potassium 4.9    Chloride 102    Calcium 9.8    Albumin 4.9    Total Bilirubin 0.4    Alkaline Phosphatase 61    AST (SGOT) 19    ALT (SGPT) 29    WBC 6.89    Hemoglobin 16.7    Hematocrit 49.9    Platelets 260    Total Cholesterol 144    Triglycerides 79    HDL Cholesterol 38    LDL Cholesterol  91    Hemoglobin A1C 6.30    Microalbumin, Urine <1.2      TSH          6/7/2024    09:00   TSH   TSH 0.989      PSA Screen (12/08/2023 09:23)     Procedures        Assessment and Plan   Diagnoses and all orders for this visit:    1. Annual physical exam (Primary)    2. Type 2 diabetes mellitus without complication, without long-term current use of insulin  -     Urinalysis With Culture If Indicated -  -     CBC & Differential  -     Comprehensive Metabolic Panel  -     Hemoglobin A1c  -     Lipid Panel  -     TSH Rfx On Abnormal To Free T4  -     Microalbumin / Creatinine Urine Ratio - Urine, Clean Catch    3. Essential hypertension  -     lisinopril (PRINIVIL,ZESTRIL) 20 MG tablet; Take 1 tablet by mouth Daily.  Dispense: 90 tablet; Refill: 1    4. Hyperlipidemia, unspecified hyperlipidemia type  -     rosuvastatin (CRESTOR) 5 MG tablet; Take 1 tablet by mouth Daily.  Dispense: 90 tablet; Refill: 1    5. Screening for malignant neoplasm of prostate  -     PSA Screen    6. Tobacco chew use         1. Annual wellness examination.  His blood pressure readings are within the normal range today at 122/80. However, there has been a weight gain of 12 pounds since his last visit in June 2024, which could be attributed to dietary changes. His current BMI is 27.4, indicating a need for weight management. His A1c levels  previously reached 6.5, which is the threshold for diabetes diagnosis. He is not currently on any medication for this condition. His last PSA screening was conducted in 2023. He has been advised to modify his diet to achieve a weight closer to 163 pounds, which is within the normal range. He has been informed about the shingles vaccine and its benefits in preventing shingles and reducing the risk of postherpetic neuralgia. He has been advised to monitor his symptoms, particularly during the summer months, to determine if there is any improvement. 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 A PSA test will be included in today's blood work for prostate screening. A six-month supply of his medications will be provided and sent to [facility].    Follow-up  The patient is scheduled for a follow-up visit in 6 months for medication refills and fasting labs.    PROCEDURE  Colonoscopy was performed in 2022.               FOLLOW UP  Return in about 6 months (around 9/17/2025) for Refills and fasting labs.    Patient was given instructions and counseling regarding his condition or for health maintenance advice. Please see specific information pulled into the AVS if appropriate.       NAHID Baron  03/17/25  09:15 EDT    CURRENT & DISCONTINUED MEDICATIONS  Current Outpatient Medications   Medication Instructions    esomeprazole (NEXIUM) 20 mg, Daily    lisinopril (PRINIVIL,ZESTRIL) 20 mg, Oral, Daily    rosuvastatin (CRESTOR) 5 mg, Oral, Daily       Medications Discontinued During This Encounter   Medication Reason    lisinopril (PRINIVIL,ZESTRIL) 20 MG tablet Reorder    rosuvastatin (CRESTOR) 5 MG tablet Reorder        Patient or patient representative verbalized consent for the use of Ambient Listening during the visit with  NAHID Baron for chart documentation. 3/17/2025  08:47 EDT

## 2025-03-17 NOTE — LETTER
Danilo Crane  [address] [facility] [address]    March 18, 2025     Dear Mr. Crane:    Morales, your kidney function, liver function, sodium, potassium, total cholesterol, triglycerides, thyroid level, PSA, and urinalysis were all normal. Your hemoglobin A1c has increased back to 6.50%, work on making dietary changes as discussed during your appointment to help prevent further elevation of this A1c.     Resulted Orders   Urinalysis With Culture If Indicated - Urine, Clean Catch   Result Value Ref Range    Color, UA Yellow Yellow, Straw    Appearance, UA Clear Clear    pH, UA 6.5 5.0 - 8.0    Specific Gravity, UA <=1.005 1.005 - 1.030    Glucose, UA Negative Negative    Ketones, UA Negative Negative    Bilirubin, UA Negative Negative    Blood, UA Negative Negative    Protein, UA Negative Negative    Leuk Esterase, UA Negative Negative    Nitrite, UA Negative Negative    Urobilinogen, UA 0.2 E.U./dL 0.2 - 1.0 E.U./dL   Comprehensive Metabolic Panel   Result Value Ref Range    Glucose 119 (H) 65 - 99 mg/dL    BUN 15 6 - 20 mg/dL    Creatinine 1.18 0.76 - 1.27 mg/dL    Sodium 141 136 - 145 mmol/L    Potassium 4.4 3.5 - 5.2 mmol/L    Chloride 102 98 - 107 mmol/L    CO2 27.0 22.0 - 29.0 mmol/L    Calcium 9.6 8.6 - 10.5 mg/dL    Total Protein 7.6 6.0 - 8.5 g/dL    Albumin 4.7 3.5 - 5.2 g/dL    ALT (SGPT) 38 1 - 41 U/L    AST (SGOT) 23 1 - 40 U/L    Alkaline Phosphatase 67 39 - 117 U/L    Total Bilirubin 0.3 0.0 - 1.2 mg/dL    Globulin 2.9 gm/dL    A/G Ratio 1.6 g/dL    BUN/Creatinine Ratio 12.7 7.0 - 25.0    Anion Gap 12.0 5.0 - 15.0 mmol/L    eGFR 73.8 >60.0 mL/min/1.73   Hemoglobin A1c   Result Value Ref Range    Hemoglobin A1C 6.50 (H) 4.80 - 5.60 %   Lipid Panel   Result Value Ref Range    Total Cholesterol 182 0 - 200 mg/dL    Triglycerides 139 0 - 150 mg/dL    HDL Cholesterol 37 (L) 40 - 60 mg/dL    LDL Cholesterol  120 (H) 0 - 100 mg/dL    VLDL Cholesterol 25 5 - 40 mg/dL    LDL/HDL Ratio 3.17    TSH  Rfx On Abnormal To Free T4   Result Value Ref Range    TSH 1.370 0.270 - 4.200 uIU/mL   Microalbumin / Creatinine Urine Ratio - Urine, Clean Catch   Result Value Ref Range    Microalbumin/Creatinine Ratio        Comment:      Unable to calculate    Creatinine, Urine 25.4 mg/dL    Microalbumin, Urine <1.2 mg/dL   PSA Screen   Result Value Ref Range    PSA 2.290 0.000 - 4.000 ng/mL   CBC Auto Differential   Result Value Ref Range    WBC 8.89 3.40 - 10.80 10*3/mm3    RBC 5.76 4.14 - 5.80 10*6/mm3    Hemoglobin 17.2 13.0 - 17.7 g/dL    Hematocrit 51.2 (H) 37.5 - 51.0 %    MCV 88.9 79.0 - 97.0 fL    MCH 29.9 26.6 - 33.0 pg    MCHC 33.6 31.5 - 35.7 g/dL    RDW 13.1 12.3 - 15.4 %    RDW-SD 42.4 37.0 - 54.0 fl    MPV 10.3 6.0 - 12.0 fL    Platelets 277 140 - 450 10*3/mm3    Neutrophil % 59.1 42.7 - 76.0 %    Lymphocyte % 27.7 19.6 - 45.3 %    Monocyte % 9.6 5.0 - 12.0 %    Eosinophil % 2.7 0.3 - 6.2 %    Basophil % 0.6 0.0 - 1.5 %    Immature Grans % 0.3 0.0 - 0.5 %    Neutrophils, Absolute 5.26 1.70 - 7.00 10*3/mm3    Lymphocytes, Absolute 2.46 0.70 - 3.10 10*3/mm3    Monocytes, Absolute 0.85 0.10 - 0.90 10*3/mm3    Eosinophils, Absolute 0.24 0.00 - 0.40 10*3/mm3    Basophils, Absolute 0.05 0.00 - 0.20 10*3/mm3    Immature Grans, Absolute 0.03 0.00 - 0.05 10*3/mm3    nRBC 0.0 0.0 - 0.2 /100 WBC   Peak Urine Culture Tube - Urine, Clean Catch   Result Value Ref Range    Extra Tube Hold for add-ons.       Comment:      Auto resulted.       Sincerely,        NAHID Baron

## 2025-04-21 NOTE — PROGRESS NOTES
"Chief Complaint  Hyperlipidemia (refills) and Hypertension (refills, discuss the HCTZ )    PHQ-2 Total Score: 0    Subjective        History reviewed. No pertinent past medical history.  Social History     Tobacco Use   • Smoking status: Never Smoker   • Smokeless tobacco: Current User     Types: Chew   Substance Use Topics   • Alcohol use: Not on file   • Drug use: Not on file      Current Outpatient Medications on File Prior to Visit   Medication Sig   • esomeprazole (nexIUM) 20 MG capsule Take 20 mg by mouth Daily.     No current facility-administered medications on file prior to visit.      No Known Allergies   Health Maintenance Due   Topic Date Due   • COLORECTAL CANCER SCREENING  Never done   • ANNUAL PHYSICAL  Never done   • COVID-19 Vaccine (1) Never done   • TDAP/TD VACCINES (1 - Tdap) Never done   • INFLUENZA VACCINE  08/01/2021      Danilo Crane presents to [facility]  Refills of medications    HTN: stable in office. Does not monitor regularly at home.  Patient denies symptoms of elevated blood pressure.    Hyperlipidemia: admits to cramping in the lower legs and feet nearly every night     Patient states he will void about every 15 minutes with use of the hydrochlorothiazide.  Patient states he wakes about 2 times per night to void      Objective   Vital Signs:   /84   Pulse 84   Temp 97.3 °F (36.3 °C)   Ht 167.6 cm (66\")   Wt 76.2 kg (168 lb)   SpO2 98%   BMI 27.12 kg/m²     Review of Systems   Respiratory: Negative for cough, shortness of breath and wheezing.    Cardiovascular: Negative for chest pain, palpitations and leg swelling.   Neurological: Negative for headache.      Physical Exam  Vitals reviewed.   Constitutional:       General: He is not in acute distress.     Appearance: Normal appearance. He is well-developed.   Eyes:      Conjunctiva/sclera: Conjunctivae normal.   Cardiovascular:      Rate and Rhythm: Normal rate and regular rhythm. "      Heart sounds: Normal heart sounds.   Pulmonary:      Effort: Pulmonary effort is normal.      Breath sounds: Normal breath sounds.   Musculoskeletal:      Cervical back: Neck supple.      Right lower leg: No edema.      Left lower leg: No edema.   Skin:     General: Skin is warm and dry.   Neurological:      Mental Status: He is alert and oriented to person, place, and time.   Psychiatric:         Mood and Affect: Mood and affect normal.         Behavior: Behavior normal.         Thought Content: Thought content normal.         Judgment: Judgment normal.        Result Review :   The following data was reviewed by: NAHID Baron on 09/27/2021:  Lipid Panel    Lipid Panel 12/19/20 9/27/21   Total Cholesterol  135   Total Cholesterol 106 (A)    Triglycerides 80 122   HDL Cholesterol 35 (A) 39 (A)   VLDL Cholesterol 16 22   LDL Cholesterol  55 (A) 74   LDL/HDL Ratio  1.84   (A) Abnormal value       Comments are available for some flowsheets but are not being displayed.                         Assessment and Plan    Diagnoses and all orders for this visit:    1. Encounter for screening for malignant neoplasm of colon (Primary)  -     Ambulatory Referral For Screening Colonoscopy    2. Hyperlipidemia, unspecified hyperlipidemia type  -     rosuvastatin (CRESTOR) 5 MG tablet; Take 1 tablet by mouth Daily.  Dispense: 90 tablet; Refill: 1  -     Lipid Panel  -     TSH Rfx On Abnormal To Free T4    3. Essential hypertension  -     lisinopril-hydrochlorothiazide (PRINZIDE,ZESTORETIC) 20-12.5 MG per tablet; Take 1 tablet by mouth Daily.  Dispense: 90 tablet; Refill: 1  -     Urinalysis With Culture If Indicated - Urine, Clean Catch  -     CBC & Differential  -     Comprehensive Metabolic Panel  -     TSH Rfx On Abnormal To Free T4  -     Urinalysis, Microscopic Only - Urine, Clean Catch    4. Screening for malignant neoplasm of prostate  -     PSA SCREENING    5. Impaired glucose tolerance  -     Hemoglobin  A1c    Will decrease Crestor based on previous lipid levels and symptoms.  Recommend patient to increase water intake and have a daily sugar-free Gatorade or take a multivitamin.      Follow Up   Return in about 6 months (around 3/27/2022) for Next scheduled follow up.  Patient was given instructions and counseling regarding his condition or for health maintenance advice. Please see specific information pulled into the AVS if appropriate.        DISPLAY PLAN FREE TEXT

## 2025-06-22 DIAGNOSIS — E78.5 HYPERLIPIDEMIA, UNSPECIFIED HYPERLIPIDEMIA TYPE: ICD-10-CM

## 2025-06-23 RX ORDER — ROSUVASTATIN CALCIUM 5 MG/1
5 TABLET, COATED ORAL DAILY
Qty: 90 TABLET | Refills: 0 | Status: SHIPPED | OUTPATIENT
Start: 2025-06-23

## 2025-08-11 DIAGNOSIS — I10 ESSENTIAL HYPERTENSION: ICD-10-CM

## 2025-08-11 RX ORDER — LISINOPRIL 20 MG/1
20 TABLET ORAL DAILY
Qty: 90 TABLET | Refills: 0 | Status: SHIPPED | OUTPATIENT
Start: 2025-08-11

## 2025-08-12 DIAGNOSIS — I10 ESSENTIAL HYPERTENSION: ICD-10-CM

## 2025-08-18 DIAGNOSIS — I10 ESSENTIAL HYPERTENSION: ICD-10-CM

## 2025-08-21 RX ORDER — LISINOPRIL 20 MG/1
20 TABLET ORAL DAILY
Qty: 90 TABLET | Refills: 0 | OUTPATIENT
Start: 2025-08-21

## 2025-08-22 ENCOUNTER — TELEPHONE (OUTPATIENT)
Dept: FAMILY MEDICINE CLINIC | Facility: CLINIC | Age: 55
End: 2025-08-22
Payer: COMMERCIAL

## 2025-08-22 DIAGNOSIS — I10 ESSENTIAL HYPERTENSION: ICD-10-CM

## 2025-08-23 RX ORDER — LISINOPRIL 20 MG/1
20 TABLET ORAL DAILY
Qty: 90 TABLET | Refills: 0 | Status: SHIPPED | OUTPATIENT
Start: 2025-08-23

## 2025-08-23 RX ORDER — LISINOPRIL 20 MG/1
20 TABLET ORAL DAILY
Qty: 90 TABLET | Refills: 0 | OUTPATIENT
Start: 2025-08-23

## (undated) DEVICE — SOLIDIFIER LIQLOC PLS 1500CC BT

## (undated) DEVICE — CONN JET HYDRA H20 AUXILIARY DISP

## (undated) DEVICE — LINER SURG CANSTR SXN S/RIGD 1500CC

## (undated) DEVICE — Device: Brand: DEFENDO AIR/WATER/SUCTION AND BIOPSY VALVE

## (undated) DEVICE — Device

## (undated) DEVICE — SOL IRRG H2O PL/BG 1000ML STRL